# Patient Record
Sex: FEMALE | Race: WHITE
[De-identification: names, ages, dates, MRNs, and addresses within clinical notes are randomized per-mention and may not be internally consistent; named-entity substitution may affect disease eponyms.]

---

## 2019-01-08 ENCOUNTER — HOSPITAL ENCOUNTER (INPATIENT)
Dept: HOSPITAL 26 - MED | Age: 44
LOS: 4 days | Discharge: HOME | DRG: 720 | End: 2019-01-12
Attending: GENERAL PRACTICE | Admitting: GENERAL PRACTICE
Payer: MEDICAID

## 2019-01-08 VITALS — BODY MASS INDEX: 28.27 KG/M2 | HEIGHT: 60 IN | WEIGHT: 144 LBS

## 2019-01-08 VITALS — SYSTOLIC BLOOD PRESSURE: 143 MMHG | DIASTOLIC BLOOD PRESSURE: 82 MMHG

## 2019-01-08 VITALS — SYSTOLIC BLOOD PRESSURE: 126 MMHG | DIASTOLIC BLOOD PRESSURE: 76 MMHG

## 2019-01-08 VITALS — SYSTOLIC BLOOD PRESSURE: 107 MMHG | DIASTOLIC BLOOD PRESSURE: 55 MMHG

## 2019-01-08 DIAGNOSIS — N92.0: ICD-10-CM

## 2019-01-08 DIAGNOSIS — E87.6: ICD-10-CM

## 2019-01-08 DIAGNOSIS — Z23: ICD-10-CM

## 2019-01-08 DIAGNOSIS — F17.210: ICD-10-CM

## 2019-01-08 DIAGNOSIS — Z79.4: ICD-10-CM

## 2019-01-08 DIAGNOSIS — E87.5: ICD-10-CM

## 2019-01-08 DIAGNOSIS — E83.42: ICD-10-CM

## 2019-01-08 DIAGNOSIS — L03.115: ICD-10-CM

## 2019-01-08 DIAGNOSIS — K59.00: ICD-10-CM

## 2019-01-08 DIAGNOSIS — Z79.899: ICD-10-CM

## 2019-01-08 DIAGNOSIS — E87.1: ICD-10-CM

## 2019-01-08 DIAGNOSIS — D50.9: ICD-10-CM

## 2019-01-08 DIAGNOSIS — F15.10: ICD-10-CM

## 2019-01-08 DIAGNOSIS — E83.41: ICD-10-CM

## 2019-01-08 DIAGNOSIS — E78.5: ICD-10-CM

## 2019-01-08 DIAGNOSIS — E11.65: ICD-10-CM

## 2019-01-08 DIAGNOSIS — Z91.19: ICD-10-CM

## 2019-01-08 DIAGNOSIS — E66.3: ICD-10-CM

## 2019-01-08 DIAGNOSIS — D47.3: ICD-10-CM

## 2019-01-08 DIAGNOSIS — A41.9: Primary | ICD-10-CM

## 2019-01-08 DIAGNOSIS — Z79.84: ICD-10-CM

## 2019-01-08 DIAGNOSIS — E87.8: ICD-10-CM

## 2019-01-08 DIAGNOSIS — E78.00: ICD-10-CM

## 2019-01-08 DIAGNOSIS — E43: ICD-10-CM

## 2019-01-08 LAB
ALBUMIN FLD-MCNC: 1.9 G/DL (ref 3.4–5)
AMYLASE SERPL-CCNC: 24 U/L (ref 25–115)
ANION GAP SERPL CALCULATED.3IONS-SCNC: 12 MMOL/L (ref 8–16)
APAP SERPL-MCNC: < 0.5 UG/ML (ref 10–30)
AST SERPL-CCNC: 30 U/L (ref 15–37)
BASOPHILS # BLD AUTO: 0 K/UL (ref 0–0.22)
BASOPHILS NFR BLD AUTO: 0.4 % (ref 0–2)
BILIRUB SERPL-MCNC: 0.2 MG/DL (ref 0–1)
BUN SERPL-MCNC: 6 MG/DL (ref 7–18)
CHLORIDE SERPL-SCNC: 96 MMOL/L (ref 98–107)
CO2 SERPL-SCNC: 30.2 MMOL/L (ref 21–32)
CREAT SERPL-MCNC: 0.6 MG/DL (ref 0.6–1.3)
EOSINOPHIL # BLD AUTO: 0.1 K/UL (ref 0–0.4)
EOSINOPHIL NFR BLD AUTO: 0.6 % (ref 0–4)
ERYTHROCYTE [DISTWIDTH] IN BLOOD BY AUTOMATED COUNT: 18.8 % (ref 11.6–13.7)
GFR SERPL CREATININE-BSD FRML MDRD: 140 ML/MIN (ref 90–?)
GLUCOSE SERPL-MCNC: 116 MG/DL (ref 74–106)
HCT VFR BLD AUTO: 24.1 % (ref 36–48)
HGB BLD-MCNC: 7.1 G/DL (ref 12–16)
IRON SERPL-MCNC: 7 UG/DL (ref 35–150)
LIPASE SERPL-CCNC: 66 U/L (ref 73–393)
LYMPHOCYTES # BLD AUTO: 1.2 K/UL (ref 2.5–16.5)
LYMPHOCYTES NFR BLD AUTO: 9 % (ref 20.5–51.1)
MAGNESIUM SERPL-MCNC: 1.5 MG/DL (ref 1.8–2.4)
MCH RBC QN AUTO: 19 PG (ref 27–31)
MCHC RBC AUTO-ENTMCNC: 29 G/DL (ref 33–37)
MCV RBC AUTO: 65 FL (ref 80–94)
MONOCYTES # BLD AUTO: 1.3 K/UL (ref 0.8–1)
MONOCYTES NFR BLD AUTO: 10.2 % (ref 1.7–9.3)
NEUTROPHILS # BLD AUTO: 10.2 K/UL (ref 1.8–7.7)
NEUTROPHILS NFR BLD AUTO: 79.8 % (ref 42.2–75.2)
PHOSPHATE SERPL-MCNC: 2.6 MG/DL (ref 2.5–4.9)
PLATELET # BLD AUTO: 499 K/UL (ref 140–450)
POTASSIUM SERPL-SCNC: 3.2 MMOL/L (ref 3.5–5.1)
PROTHROMBIN TIME: 10.7 SECS (ref 10.8–13.4)
RBC # BLD AUTO: 3.71 MIL/UL (ref 4.2–5.4)
SALICYLATES SERPL-MCNC: < 2.8 MG/DL (ref 2.8–20)
SODIUM SERPL-SCNC: 135 MMOL/L (ref 136–145)
T4 FREE SERPL-MCNC: 0.93 NG/DL (ref 0.76–1.46)
TIBC SERPL-MCNC: 187 UG/DL (ref 250–450)
TSH SERPL DL<=0.05 MIU/L-ACNC: 3.07 UIU/ML (ref 0.34–3.74)
WBC # BLD AUTO: 12.8 K/UL (ref 4.8–10.8)

## 2019-01-08 PROCEDURE — G0480 DRUG TEST DEF 1-7 CLASSES: HCPCS

## 2019-01-08 PROCEDURE — P9016 RBC LEUKOCYTES REDUCED: HCPCS

## 2019-01-08 PROCEDURE — G0482 DRUG TEST DEF 15-21 CLASSES: HCPCS

## 2019-01-08 RX ADMIN — Medication SCH DEV: at 07:30

## 2019-01-08 RX ADMIN — OXYCODONE HYDROCHLORIDE AND ACETAMINOPHEN SCH MG: 500 TABLET ORAL at 10:48

## 2019-01-08 RX ADMIN — Medication SCH EACH: at 10:48

## 2019-01-08 RX ADMIN — Medication SCH DEV: at 21:08

## 2019-01-08 RX ADMIN — DOCUSATE SODIUM SCH MG: 100 CAPSULE, LIQUID FILLED ORAL at 20:45

## 2019-01-08 RX ADMIN — INSULIN LISPRO PRN UNITS: 100 INJECTION, SOLUTION INTRAVENOUS; SUBCUTANEOUS at 14:15

## 2019-01-08 RX ADMIN — VANCOMYCIN HYDROCHLORIDE SCH MLS/HR: 1 INJECTION, SOLUTION INTRAVENOUS at 19:12

## 2019-01-08 RX ADMIN — MAGNESIUM SULFATE IN WATER SCH MLS/HR: 40 INJECTION, SOLUTION INTRAVENOUS at 17:00

## 2019-01-08 RX ADMIN — Medication SCH DEV: at 11:30

## 2019-01-08 RX ADMIN — FERROUS SULFATE TAB 325 MG (65 MG ELEMENTAL FE) SCH MG: 325 (65 FE) TAB at 18:33

## 2019-01-08 RX ADMIN — SODIUM CHLORIDE SCH MLS/HR: 9 INJECTION, SOLUTION INTRAVENOUS at 06:35

## 2019-01-08 RX ADMIN — HYDROCODONE BITARTRATE AND ACETAMINOPHEN PRN TAB: 7.5; 325 TABLET ORAL at 20:02

## 2019-01-08 RX ADMIN — INSULIN LISPRO PRN UNITS: 100 INJECTION, SOLUTION INTRAVENOUS; SUBCUTANEOUS at 21:15

## 2019-01-08 RX ADMIN — DOCUSATE SODIUM SCH MG: 100 CAPSULE, LIQUID FILLED ORAL at 10:48

## 2019-01-08 RX ADMIN — OXYCODONE HYDROCHLORIDE AND ACETAMINOPHEN SCH MG: 500 TABLET ORAL at 20:45

## 2019-01-08 RX ADMIN — SODIUM FERRIC GLUCONATE COMPLEX SCH MLS/HR: 12.5 INJECTION INTRAVENOUS at 13:33

## 2019-01-08 RX ADMIN — HYDROCODONE BITARTRATE AND ACETAMINOPHEN PRN TAB: 7.5; 325 TABLET ORAL at 16:06

## 2019-01-08 RX ADMIN — SODIUM CHLORIDE SCH MLS/HR: 9 INJECTION, SOLUTION INTRAVENOUS at 16:35

## 2019-01-08 RX ADMIN — INSULIN LISPRO PRN UNITS: 100 INJECTION, SOLUTION INTRAVENOUS; SUBCUTANEOUS at 18:34

## 2019-01-08 RX ADMIN — KETOROLAC TROMETHAMINE PRN MG: 30 INJECTION, SOLUTION INTRAMUSCULAR; INTRAVENOUS at 20:43

## 2019-01-08 RX ADMIN — POTASSIUM CHLORIDE SCH MEQ: 750 TABLET, FILM COATED, EXTENDED RELEASE ORAL at 10:49

## 2019-01-08 RX ADMIN — Medication SCH DEV: at 16:30

## 2019-01-08 RX ADMIN — MAGNESIUM SULFATE IN WATER SCH MLS/HR: 40 INJECTION, SOLUTION INTRAVENOUS at 10:50

## 2019-01-08 NOTE — NUR
ASSUMED CARE OF PT AT THIS TIME. C/O RIGHT LOWER EXTREMITY REDNESS/SWELLING AND 
3+ PITTING X 3 WEEKS. PT SEEN HERE/ADMITTED 1 WEEK FOR SAME COMPLAINT. PT IS 
AAOX4; PATIENT STATES PAIN OF 10/10 AT THIS TIME; VSS; PATIENT POSITIONED FOR 
COMFORT; HOB ELEVATED; BEDRAILS UP X2; BED DOWN. ER MD MADE AWARE OF PT STATUS. 
WILL CONTINUE TO MONITOR.

## 2019-01-08 NOTE — NUR
Patient will be admitted to care of [facility]. Admitted to MED-SURG.  Will go to 
room 122A. Belongings list completed.  Report to VIOLA JONES.

## 2019-01-08 NOTE — NUR
PATIENT HAS BEEN SCREENED AND CATEGORIZED AS HIGH NUTRITION RISK. PATIENT WILL BE SEEN 
WITHIN 1-2 DAYS OF ADMISSION.



01/08/19-01/09/19



ALEN BONE RD

## 2019-01-08 NOTE — NUR
RECHECKED PT'S TEMP, IT  (ORAL) AT THIS TIME. MD IS AWARE. COOLING MEASURES IN PLACE. 
WILL CONTINUE TO MONITOR PT.

## 2019-01-08 NOTE — NUR
RECEIVED PT REPORT FROM THE ED NURSE. PT IS AWAKE AND ALERT, Wallisian SPEAKING ONLY, BUT 
UNDERSTANDS A LITTLE BIT OF ENGLISH. PT IS ON ROOM AIR, SKIN IS INTACT. CELLULITIS REDNESS 
NOTED  ON THE PT'S R CALF, WHICH THE PT REPORTS AS PAINFUL. IV SITE NOTED ON THE L AC, 20 
GAUGE. INFUSING VANCO AT THIS TIME. PT STATES THAT SHE IS NOT ABLE TO WALK A LONG DISTANCE 
AT THIS TIME BECAUSE HER LEG HURTS DUE TO CELLULITIS. SHE WOULD LIKE TO HAVE A BEDSIDE 
COMMODE. CALL LIGHT PROVIDED WITHIN REACH. VS STABLE, NARES SWABBED FOR MRSA. WILL CONTINUE 
TO MONITOR PT. 

-------------------------------------------------------------------------------

Addendum: 01/08/19 at 0844 by Patricia Prasad RN

-------------------------------------------------------------------------------

VITAL SIGNS ON ADMISSION: /55, HR 98, O2 99 ON RA, TEMP 98.8, RESP 16.

## 2019-01-08 NOTE — NUR
RECEIVED PT REPORT FROM THE AM SHIFT NURSE. PT IS AWAKE AND ALERT, Slovak SPEAKING NO 
RESPIRATORY DISTRESS NOTED. PT IS ON ROOM AIR, SKIN IS INTACT. CELLULITIS REDNESS NOTED  ON 
THE PT'S R CALF, WHICH THE PT REPORTS AS PAINFUL. PREVIOUS SITE L AC, 20 GAUGE INFILTRATED 
WHILE VANCO INFUSING, REDNESS SWELLING NOTED.  ADMINISTERED PAIN MEDS. REINSERTED IV ON LEFT 
HAND G 22, INFUSING VANCO CONTINUED FROM LAST SHIFT. CALL LIGHT PROVIDED WITHIN REACH.  WILL 
CONTINUE TO MONITOR PT.

## 2019-01-08 NOTE — NUR
PT IS AWAKE AND INSULIN 4 UNITS WAS GIVEN ON THE RT UA, BLOOD GLUCOSE WAS CHECKED WITH VIOLA JONES AND . PT TOLERATED THE MEDICATION. WILL INFORM AM RN ON DUTY.

## 2019-01-08 NOTE — NUR
PT'S TEMP AT THIS TIME .9. MD IS AWARE. PT'S RLE APPEARS MORE SWOLLEN THAN AT 
ADMISSION. NORCO WAS GIVEN FOR 7/10 PAIN IN THE RLE. COOLING MEASURES IN PLACE. WILL 
CONTINUE TO MONITOR.

## 2019-01-09 VITALS — SYSTOLIC BLOOD PRESSURE: 118 MMHG | DIASTOLIC BLOOD PRESSURE: 64 MMHG

## 2019-01-09 VITALS — SYSTOLIC BLOOD PRESSURE: 116 MMHG | DIASTOLIC BLOOD PRESSURE: 67 MMHG

## 2019-01-09 VITALS — SYSTOLIC BLOOD PRESSURE: 122 MMHG | DIASTOLIC BLOOD PRESSURE: 68 MMHG

## 2019-01-09 LAB
AMORPH SED URNS QL MICRO: (no result) /HPF
ANION GAP SERPL CALCULATED.3IONS-SCNC: 18.6 MMOL/L (ref 8–16)
APPEARANCE UR: (no result)
BARBITURATES UR QL SCN: NEGATIVE NG/ML
BENZODIAZ UR QL SCN: NEGATIVE NG/ML
BILIRUB UR QL STRIP: (no result)
BUN SERPL-MCNC: 12 MG/DL (ref 7–18)
BZE UR QL SCN: NEGATIVE NG/ML
CANNABINOIDS UR QL SCN: NEGATIVE NG/ML
CHLORIDE SERPL-SCNC: 96 MMOL/L (ref 98–107)
CHOLEST/HDLC SERPL: 3 {RATIO} (ref 1–4.5)
CO2 SERPL-SCNC: 24.6 MMOL/L (ref 21–32)
COARSE GRAN CASTS URNS QL MICRO: (no result) /LPF
COLOR UR: (no result)
CREAT SERPL-MCNC: 0.8 MG/DL (ref 0.6–1.3)
EOSINOPHIL NFR BLD MANUAL: 1 % (ref 0–4)
ERYTHROCYTE [DISTWIDTH] IN BLOOD BY AUTOMATED COUNT: 18.9 % (ref 11.6–13.7)
FERRITIN SERPL-MCNC: 98 NG/ML (ref 15–150)
FOLATE SERPL-MCNC: 13.1 NG/ML (ref 3–?)
GFR SERPL CREATININE-BSD FRML MDRD: 101 ML/MIN (ref 90–?)
GLUCOSE SERPL-MCNC: 132 MG/DL (ref 74–106)
GLUCOSE UR STRIP-MCNC: NEGATIVE MG/DL
HCT VFR BLD AUTO: 23.3 % (ref 36–48)
HDLC SERPL-MCNC: 32 MG/DL (ref 40–60)
HGB BLD-MCNC: 6.9 G/DL (ref 12–16)
HGB UR QL STRIP: (no result)
LDLC SERPL CALC-MCNC: 46 MG/DL (ref 60–100)
LEUKOCYTE ESTERASE UR QL STRIP: NEGATIVE
LYMPHOCYTES NFR BLD MANUAL: 12 % (ref 20–46)
MAGNESIUM SERPL-MCNC: 2.6 MG/DL (ref 1.8–2.4)
MCH RBC QN AUTO: 19 PG (ref 27–31)
MCHC RBC AUTO-ENTMCNC: 30 G/DL (ref 33–37)
MCV RBC AUTO: 65.8 FL (ref 80–94)
MONOCYTES NFR BLD MANUAL: 9 % (ref 5–12)
NITRITE UR QL STRIP: NEGATIVE
NRBC BLD MANUAL-RTO: 1 /100WBC (ref 0–0)
OPIATES UR QL SCN: POSITIVE NG/ML
PCP UR QL SCN: NEGATIVE NG/ML
PH UR STRIP: 6 [PH] (ref 5–9)
PHOSPHATE SERPL-MCNC: 3.1 MG/DL (ref 2.5–4.9)
PLATELET # BLD AUTO: 527 K/UL (ref 140–450)
POTASSIUM SERPL-SCNC: 4.2 MMOL/L (ref 3.5–5.1)
RBC # BLD AUTO: 3.53 MIL/UL (ref 4.2–5.4)
RBC #/AREA URNS HPF: (no result) /HPF (ref 0–5)
SODIUM SERPL-SCNC: 135 MMOL/L (ref 136–145)
TRANSFERRIN SERPL-MCNC: 159 MG/DL (ref 200–370)
TRIGL SERPL-MCNC: 95 MG/DL (ref 30–150)
VIT B12 SERPL-MCNC: 1003 PG/ML (ref 232–1245)
WBC # BLD AUTO: 15.9 K/UL (ref 4.8–10.8)
WBC,URINE: (no result) /HPF (ref 0–5)

## 2019-01-09 RX ADMIN — Medication SCH DEV: at 20:58

## 2019-01-09 RX ADMIN — SODIUM CHLORIDE SCH MLS/HR: 9 INJECTION, SOLUTION INTRAVENOUS at 22:52

## 2019-01-09 RX ADMIN — DOCUSATE SODIUM SCH MG: 100 CAPSULE, LIQUID FILLED ORAL at 09:29

## 2019-01-09 RX ADMIN — SODIUM FERRIC GLUCONATE COMPLEX SCH MLS/HR: 12.5 INJECTION INTRAVENOUS at 11:02

## 2019-01-09 RX ADMIN — INSULIN LISPRO PRN UNITS: 100 INJECTION, SOLUTION INTRAVENOUS; SUBCUTANEOUS at 06:51

## 2019-01-09 RX ADMIN — HYDROCODONE BITARTRATE AND ACETAMINOPHEN PRN TAB: 7.5; 325 TABLET ORAL at 11:01

## 2019-01-09 RX ADMIN — VANCOMYCIN HYDROCHLORIDE SCH MLS/HR: 1 INJECTION, SOLUTION INTRAVENOUS at 05:48

## 2019-01-09 RX ADMIN — LISINOPRIL SCH MG: 5 TABLET ORAL at 09:29

## 2019-01-09 RX ADMIN — Medication SCH DEV: at 17:15

## 2019-01-09 RX ADMIN — OXYCODONE HYDROCHLORIDE AND ACETAMINOPHEN SCH MG: 500 TABLET ORAL at 20:47

## 2019-01-09 RX ADMIN — POTASSIUM CHLORIDE SCH MEQ: 750 TABLET, FILM COATED, EXTENDED RELEASE ORAL at 06:22

## 2019-01-09 RX ADMIN — SODIUM CHLORIDE SCH MLS/HR: 9 INJECTION, SOLUTION INTRAVENOUS at 02:35

## 2019-01-09 RX ADMIN — Medication SCH EACH: at 09:28

## 2019-01-09 RX ADMIN — FERROUS SULFATE TAB 325 MG (65 MG ELEMENTAL FE) SCH MG: 325 (65 FE) TAB at 09:29

## 2019-01-09 RX ADMIN — SODIUM CHLORIDE SCH MLS/HR: 9 INJECTION, SOLUTION INTRAVENOUS at 12:42

## 2019-01-09 RX ADMIN — OXYCODONE HYDROCHLORIDE AND ACETAMINOPHEN SCH MG: 500 TABLET ORAL at 09:28

## 2019-01-09 RX ADMIN — Medication SCH DEV: at 06:21

## 2019-01-09 RX ADMIN — HYDROCODONE BITARTRATE AND ACETAMINOPHEN PRN TAB: 7.5; 325 TABLET ORAL at 06:22

## 2019-01-09 RX ADMIN — DOCUSATE SODIUM SCH MG: 100 CAPSULE, LIQUID FILLED ORAL at 20:47

## 2019-01-09 RX ADMIN — FERROUS SULFATE TAB 325 MG (65 MG ELEMENTAL FE) SCH MG: 325 (65 FE) TAB at 17:13

## 2019-01-09 RX ADMIN — INSULIN LISPRO PRN UNITS: 100 INJECTION, SOLUTION INTRAVENOUS; SUBCUTANEOUS at 20:52

## 2019-01-09 RX ADMIN — VANCOMYCIN HYDROCHLORIDE SCH MLS/HR: 1 INJECTION, SOLUTION INTRAVENOUS at 12:42

## 2019-01-09 RX ADMIN — Medication SCH DEV: at 11:30

## 2019-01-09 RX ADMIN — HYDROCODONE BITARTRATE AND ACETAMINOPHEN PRN TAB: 7.5; 325 TABLET ORAL at 20:48

## 2019-01-09 NOTE — NUR
PT IS AWAKE AND SEATED ON THE BED, EATING HER LUNCH, IV MEDICATION WAS GIVEN VIA PIGGYBACK, 
PT TOLERATED IT AND WILL MONITOR PT.

## 2019-01-09 NOTE — NUR
WAS INFORMED BY LAB THAT PT HAS LOW HEMOGLOBIN. A.M RESIDENT PHYSICIAN AWARE. ENDORSED TO 
NEXT SHIFT.

## 2019-01-09 NOTE — NUR
DR. OROURKE CAME TO PT ROOM AND ASSESSED PT WITH DR. MENDOZA AND DR. LAWRENCE ON THE 
BEDSIDE. BLOOD GLUCOSE CHEK DONE AND RESULT  AND NO INSULIN COVERAGE NEEDED. WILL 
CONTINUE TO MONITOR PT.

## 2019-01-09 NOTE — NUR
ENDORSED TO AM SHIFT WUTH STABLE VITAL SIGNS, AFEBRILE 99.2 SINCE LAST EPISODE OF FEVER. PT 
WAS GIVEN NORCO AT 0622 DUE TO PAIN 8/10 ON R LE CELLULITIS

## 2019-01-09 NOTE — NUR
PT IS AWAKE AND BLOOD GLUCOSE CHECK DONE AND RESULT  AND NO INSULIN COVERAGE NEEDED, 
VITAL SIGN TAKEN AND IS WITHIN NORMAL LIMIT. ORAL MEDICATIONS GIVEN AND PT TOLERATED IT. NO 
SIGN OF DISTRESS NOTED. WILL MONITOR PT.

## 2019-01-09 NOTE — NUR
RECEIVED REPORT FROM DAYSHIFT NURSE AT BEDSIDE FOR CONTINUITY OF CARE. NO SOB NO S/S OF 
DISTRESS ON RA. IV NOTED L WRIST 22 G NS 100ML/HR. BED LOWERED CALL LIGHT WITHIN REACH WILL 
CONTINUE TO MONITOR.

## 2019-01-09 NOTE — NUR
1/9/19 RD INITIAL ASSESSMENT COMPLETED



PLEASE REFER TO NUTRITION ASSESSMENT UNDER CARE ACTIVITY FOR ESTIMATED NUTRITIONAL NEEDS. 



1. CONTINUE CCHO 60 GM DIET AS TOLERATED 

2. PROVIDED NUTRITION EDUCATION ON ANEMIA. PT ACCEPTED

3. RD TO FOLLOW UP WITH ADDITIONAL DIABETES NUTRITION EDUCATION

4. RD TO FOLLOW-UP 3-5 DAYS, MODERATE RISK 



ALEN BONE RD

## 2019-01-09 NOTE — NUR
RECEIVED PT FROM NIGHT SHIFT NURSEKRYSTIN, PT IS AWAKE AND LYING ON THE BED WITH SIDE 
RAILS UP AND CALL LIGHT WITHIN REACH, PT HAS AN IV LINE ON THE RT WRIST G.22 WITH NS AT 
100ML/HR INFUSING. NO SOB AND PT VERBALIZED A PAIN RATE OF 7/10, NO OTHER UNTOWARD SIGN 
NOTED. WILL CONTINUE TO MONITOR PT.

## 2019-01-10 VITALS — SYSTOLIC BLOOD PRESSURE: 106 MMHG | DIASTOLIC BLOOD PRESSURE: 59 MMHG

## 2019-01-10 VITALS — DIASTOLIC BLOOD PRESSURE: 54 MMHG | SYSTOLIC BLOOD PRESSURE: 107 MMHG

## 2019-01-10 VITALS — SYSTOLIC BLOOD PRESSURE: 120 MMHG | DIASTOLIC BLOOD PRESSURE: 72 MMHG

## 2019-01-10 VITALS — SYSTOLIC BLOOD PRESSURE: 127 MMHG | DIASTOLIC BLOOD PRESSURE: 64 MMHG

## 2019-01-10 VITALS — DIASTOLIC BLOOD PRESSURE: 68 MMHG | SYSTOLIC BLOOD PRESSURE: 117 MMHG

## 2019-01-10 LAB
ANION GAP SERPL CALCULATED.3IONS-SCNC: 13.3 MMOL/L (ref 8–16)
APPEARANCE UR: (no result)
BASOPHILS # BLD AUTO: 0 K/UL (ref 0–0.22)
BASOPHILS NFR BLD AUTO: 0.2 % (ref 0–2)
BILIRUB UR QL STRIP: NEGATIVE
BUN SERPL-MCNC: 10 MG/DL (ref 7–18)
CHLORIDE SERPL-SCNC: 99 MMOL/L (ref 98–107)
CO2 SERPL-SCNC: 24.2 MMOL/L (ref 21–32)
COLOR UR: YELLOW
CREAT SERPL-MCNC: 0.8 MG/DL (ref 0.6–1.3)
EOSINOPHIL # BLD AUTO: 0 K/UL (ref 0–0.4)
EOSINOPHIL NFR BLD AUTO: 0.2 % (ref 0–4)
ERYTHROCYTE [DISTWIDTH] IN BLOOD BY AUTOMATED COUNT: 18.7 % (ref 11.6–13.7)
ERYTHROCYTE [DISTWIDTH] IN BLOOD BY AUTOMATED COUNT: 20.3 % (ref 11.6–13.7)
GFR SERPL CREATININE-BSD FRML MDRD: 101 ML/MIN (ref 90–?)
GLUCOSE SERPL-MCNC: 161 MG/DL (ref 74–106)
GLUCOSE UR STRIP-MCNC: NEGATIVE MG/DL
HCT VFR BLD AUTO: 21 % (ref 36–48)
HCT VFR BLD AUTO: 22.8 % (ref 36–48)
HGB BLD-MCNC: 6 G/DL (ref 12–16)
HGB BLD-MCNC: 6.7 G/DL (ref 12–16)
HGB UR QL STRIP: NEGATIVE
LEUKOCYTE ESTERASE UR QL STRIP: NEGATIVE
LYMPHOCYTES # BLD AUTO: 0.6 K/UL (ref 2.5–16.5)
LYMPHOCYTES NFR BLD AUTO: 3.7 % (ref 20.5–51.1)
LYMPHOCYTES NFR BLD MANUAL: 17 % (ref 20–46)
MAGNESIUM SERPL-MCNC: 2 MG/DL (ref 1.8–2.4)
MCH RBC QN AUTO: 19 PG (ref 27–31)
MCH RBC QN AUTO: 20 PG (ref 27–31)
MCHC RBC AUTO-ENTMCNC: 29 G/DL (ref 33–37)
MCHC RBC AUTO-ENTMCNC: 30 G/DL (ref 33–37)
MCV RBC AUTO: 65.7 FL (ref 80–94)
MCV RBC AUTO: 67.4 FL (ref 80–94)
MONOCYTES # BLD AUTO: 1.5 K/UL (ref 0.8–1)
MONOCYTES NFR BLD AUTO: 9.1 % (ref 1.7–9.3)
MONOCYTES NFR BLD MANUAL: 6 % (ref 5–12)
NEUTROPHILS # BLD AUTO: 14.3 K/UL (ref 1.8–7.7)
NEUTROPHILS NFR BLD AUTO: 86.8 % (ref 42.2–75.2)
NITRITE UR QL STRIP: NEGATIVE
PH UR STRIP: 6 [PH] (ref 5–9)
PHOSPHATE SERPL-MCNC: 3.6 MG/DL (ref 2.5–4.9)
PLATELET # BLD AUTO: 525 K/UL (ref 140–450)
PLATELET # BLD AUTO: 567 K/UL (ref 140–450)
POTASSIUM SERPL-SCNC: 4.5 MMOL/L (ref 3.5–5.1)
RBC # BLD AUTO: 3.19 MIL/UL (ref 4.2–5.4)
RBC # BLD AUTO: 3.38 MIL/UL (ref 4.2–5.4)
SODIUM SERPL-SCNC: 132 MMOL/L (ref 136–145)
WBC # BLD AUTO: 14.1 K/UL (ref 4.8–10.8)
WBC # BLD AUTO: 16.5 K/UL (ref 4.8–10.8)

## 2019-01-10 PROCEDURE — 30233N1 TRANSFUSION OF NONAUTOLOGOUS RED BLOOD CELLS INTO PERIPHERAL VEIN, PERCUTANEOUS APPROACH: ICD-10-PCS | Performed by: GENERAL PRACTICE

## 2019-01-10 RX ADMIN — Medication SCH EACH: at 08:57

## 2019-01-10 RX ADMIN — Medication SCH DEV: at 20:30

## 2019-01-10 RX ADMIN — SODIUM CHLORIDE SCH MLS/HR: 9 INJECTION, SOLUTION INTRAVENOUS at 18:35

## 2019-01-10 RX ADMIN — DOCUSATE SODIUM SCH MG: 100 CAPSULE, LIQUID FILLED ORAL at 20:29

## 2019-01-10 RX ADMIN — Medication SCH DEV: at 11:34

## 2019-01-10 RX ADMIN — Medication SCH DEV: at 06:44

## 2019-01-10 RX ADMIN — SODIUM FERRIC GLUCONATE COMPLEX SCH MLS/HR: 12.5 INJECTION INTRAVENOUS at 10:42

## 2019-01-10 RX ADMIN — DOCUSATE SODIUM SCH MG: 100 CAPSULE, LIQUID FILLED ORAL at 08:56

## 2019-01-10 RX ADMIN — VANCOMYCIN HYDROCHLORIDE SCH MLS/HR: 1 INJECTION, SOLUTION INTRAVENOUS at 12:00

## 2019-01-10 RX ADMIN — TAZOBACTAM SODIUM AND PIPERACILLIN SODIUM SCH MLS/HR: 375; 3 INJECTION, SOLUTION INTRAVENOUS at 17:36

## 2019-01-10 RX ADMIN — INSULIN LISPRO PRN UNITS: 100 INJECTION, SOLUTION INTRAVENOUS; SUBCUTANEOUS at 20:34

## 2019-01-10 RX ADMIN — SODIUM CHLORIDE SCH MLS/HR: 9 INJECTION, SOLUTION INTRAVENOUS at 09:50

## 2019-01-10 RX ADMIN — TAZOBACTAM SODIUM AND PIPERACILLIN SODIUM SCH MLS/HR: 375; 3 INJECTION, SOLUTION INTRAVENOUS at 12:00

## 2019-01-10 RX ADMIN — INSULIN LISPRO PRN UNITS: 100 INJECTION, SOLUTION INTRAVENOUS; SUBCUTANEOUS at 11:34

## 2019-01-10 RX ADMIN — HYDROCODONE BITARTRATE AND ACETAMINOPHEN PRN TAB: 7.5; 325 TABLET ORAL at 20:29

## 2019-01-10 RX ADMIN — Medication SCH DEV: at 17:09

## 2019-01-10 RX ADMIN — VANCOMYCIN HYDROCHLORIDE SCH MLS/HR: 1 INJECTION, SOLUTION INTRAVENOUS at 01:29

## 2019-01-10 RX ADMIN — OXYCODONE HYDROCHLORIDE AND ACETAMINOPHEN SCH MG: 500 TABLET ORAL at 20:29

## 2019-01-10 RX ADMIN — PSYLLIUM HUSK SCH GM: 3.4 POWDER ORAL at 20:30

## 2019-01-10 RX ADMIN — FERROUS SULFATE TAB 325 MG (65 MG ELEMENTAL FE) SCH MG: 325 (65 FE) TAB at 17:36

## 2019-01-10 RX ADMIN — FERROUS SULFATE TAB 325 MG (65 MG ELEMENTAL FE) SCH MG: 325 (65 FE) TAB at 08:57

## 2019-01-10 RX ADMIN — LISINOPRIL SCH MG: 5 TABLET ORAL at 08:57

## 2019-01-10 RX ADMIN — OXYCODONE HYDROCHLORIDE AND ACETAMINOPHEN SCH MG: 500 TABLET ORAL at 08:57

## 2019-01-10 RX ADMIN — INSULIN GLARGINE SCH UNITS: 100 INJECTION, SOLUTION SUBCUTANEOUS at 20:33

## 2019-01-10 RX ADMIN — HYDROCODONE BITARTRATE AND ACETAMINOPHEN PRN TAB: 7.5; 325 TABLET ORAL at 05:08

## 2019-01-10 NOTE — NUR
PT RESTING IN BED. NO S/S OF RESPIRATORY DISTRESS OR DISCOMFORT NOTED AT THIS TIME. WILL 
CONTINUE TO MONITOR.

## 2019-01-10 NOTE — NUR
PATIENT AWAKE, ALERT. RESPIRATION EVEN, UNLABOR ON ROOM AIR. BLOOD IS TRANSFUSING WELL. NO 
SIGNS OF ALLERGIC REACTION SEEN. PATIENT IS STABLE AT THIS TIME. CALL LIGHT WITHIN REACH

## 2019-01-10 NOTE — NUR
IV ON AC WAS CLOGGED, IV WAS REMOVED, CATHETER INTACT, NO ACTIVE BLEEDING SEEN. NEW IV WAS 
STARTED ON RIGHT AC. PATIENT TOLERATED WELL

## 2019-01-10 NOTE — NUR
RECEIVED REPORT FROM DAY SHIFT NURSE JACQUELINE-RN AT BEDSIDE. PT AOX4- Somali SPEAKER, ON ROOM 
AIR WITH IV SITE ON RIGHT AC #22G RUNNING NS @ 100ML/HR. RIGHT LOWER EXTREMITY CELLULITIS. 
DISCUSSED PLAN OF CARE AND PT VERBALIZED UNDERSTANDING. NO S/S OF RESPIRATORY DISTRESS OR 
DISCOMFORT NOTED AT THIS TIME. BED IN LOWEST POSITION, BED BREAKS ON, AND BOTH SIDE RAILS 
UP. BEDSIDE TABLE AND CALL LIGHT ARE WITHIN REACH. WILL CONTINUE TO MONITOR.

## 2019-01-10 NOTE — NUR
PT STATED SHE FELT VERY DIZZY AND NAUSEA. SHE STATED SHE FELT AS IS SHE WAS GOING TO FALL 
GOING TO BEDSIDE COMMODE. SHE STATED HER BLOOD SUGAR FELT LOW. JAZLYN CHECKED HER BLOOD 
SUGAR AND HER BLOOD SUGAR WAS 54. PT HAS NO IV ACCESS AT THIS TIME. GIVING HER ORANGE JUICE 
WITH SUGAR. AND WILL START IV TO GIVE D50.

## 2019-01-10 NOTE — NUR
SCHEDULED MEDICATION GIVEN AND INSULIN COVERAGE GIVEN AND TOLERATED WELL. PT C/O PAIN 6/10- 
NORCO GIVEN FOR PAIN. PT TOLERATED WELL. NO S/S OF RESPIRATORY DISTRESS OR DISCOMFORT NOTED 
AT THIS TIME. WILL CONTINUE TO MONITOR.

## 2019-01-10 NOTE — NUR
PATIENT WAS AWAKE, ALERT, SITING ON THE CHAIR COMFORTABLY. RESPIRATION EVEN, UNLABOR ON ROOM 
AIR. NO DISTRESS NOTED AT THIS TIME

## 2019-01-10 NOTE — NUR
PATIENT COMPLAINED OF ITCHINESS WITH FACIAL SWOLLEN. BLOOD TRANSFUSION WAS STOPPED. DR. KYLE WAS MADE AWARE.

## 2019-01-10 NOTE — NUR
RE-CHECK BLOOD SUGAR 149. WILL CONTINUE TO MONITOR. PT FEELING BETTER AND EATING SANDWICH 
AND DIET COKE.

## 2019-01-10 NOTE — NUR
PATIENT WAS AWAKE, ALERT, EATING BREAKFAST COMFORTABLY. RESPIRATION EVEN, UNLABOR ON ROOM 
AIR. SKIN DRY AND WARM. IV PATENT AND INTACT. COMPLAINED OF LEG PAIN 7/10, WILL MEDICATE PER 
ORDER. PLAN OF CARE WAS DISCUSSED WITH PATIENT. BED AT LOW POSITION, SIDE RAILS UP. CALL 
LIGHT WITHIN REACH

## 2019-01-10 NOTE — NUR
PATIENT WAS AWAKE, ALERT. RESPIRATION EVEN, UNLABOR ON ROOM AIR. FACIAL SWOLLEN WAS 
IMPROVED. NO DISTRESS WAS NOTED. IV PATENT AND INTACT. CALL LIGHT WITHIN REACH

## 2019-01-10 NOTE — NUR
BLOOD TRANSFUSION WAS OBTAINED AT BEDSIDE, SIGNED BY PATIENT

-------------------------------------------------------------------------------

Addendum: 01/10/19 at 1848 by Taryn Tan RN

-------------------------------------------------------------------------------

BLOOD TRANSFUSION CONSENT WAS OBTAINED

## 2019-01-10 NOTE — NUR
VITAL SIGNS TAKEN AND TOLERATED WELL. BLOOD GLUCOSE 274- WILL ADMINISTER INSULIN COVERAGE. 
NO S/S OF RESPIRATORY DISTRESS OR DISCOMFORT NOTED AT THIS TIME. WILL CONTINUE TO MONITOR.

## 2019-01-10 NOTE — NUR
PATIENT DEVELOP ALLERGIC REACTION TO BLOOD TRANFUSION WITH FACIAL AND TOUNGUE SWOLLEN, AND 
GENERALIZED ITCHINESS. BLOOD TRANFUSION WAS STOPPED, IV WAS FLUSHED WITH 10 CC NS. IV WAS 
RECONNECTED TO IVF NS  CC/HR. DR. TEJADA WAS MADE AWARE. BLOOD BAG AND LINE WAS 
RETURNED TO LAB.  PATIENT WAS PLACED ON CARDIAC MONITOR PER ORDER. MEDS WERE GIVEN

## 2019-01-11 VITALS — DIASTOLIC BLOOD PRESSURE: 65 MMHG | SYSTOLIC BLOOD PRESSURE: 113 MMHG

## 2019-01-11 VITALS — SYSTOLIC BLOOD PRESSURE: 106 MMHG | DIASTOLIC BLOOD PRESSURE: 66 MMHG

## 2019-01-11 VITALS — SYSTOLIC BLOOD PRESSURE: 94 MMHG | DIASTOLIC BLOOD PRESSURE: 56 MMHG

## 2019-01-11 VITALS — DIASTOLIC BLOOD PRESSURE: 77 MMHG | SYSTOLIC BLOOD PRESSURE: 124 MMHG

## 2019-01-11 LAB
ANION GAP SERPL CALCULATED.3IONS-SCNC: 11 MMOL/L (ref 8–16)
ANION GAP SERPL CALCULATED.3IONS-SCNC: 13.2 MMOL/L (ref 8–16)
BASOPHILS # BLD AUTO: 0 K/UL (ref 0–0.22)
BASOPHILS NFR BLD AUTO: 0.3 % (ref 0–2)
BUN SERPL-MCNC: 18 MG/DL (ref 7–18)
BUN SERPL-MCNC: 24 MG/DL (ref 7–18)
CHLORIDE SERPL-SCNC: 95 MMOL/L (ref 98–107)
CHLORIDE SERPL-SCNC: 98 MMOL/L (ref 98–107)
CO2 SERPL-SCNC: 24.4 MMOL/L (ref 21–32)
CO2 SERPL-SCNC: 25.6 MMOL/L (ref 21–32)
CREAT SERPL-MCNC: 0.9 MG/DL (ref 0.6–1.3)
CREAT SERPL-MCNC: 1.2 MG/DL (ref 0.6–1.3)
EOSINOPHIL # BLD AUTO: 0.1 K/UL (ref 0–0.4)
EOSINOPHIL NFR BLD AUTO: 0.7 % (ref 0–4)
ERYTHROCYTE [DISTWIDTH] IN BLOOD BY AUTOMATED COUNT: 20.7 % (ref 11.6–13.7)
GFR SERPL CREATININE-BSD FRML MDRD: 63 ML/MIN (ref 90–?)
GFR SERPL CREATININE-BSD FRML MDRD: 88 ML/MIN (ref 90–?)
GLUCOSE SERPL-MCNC: 348 MG/DL (ref 74–106)
GLUCOSE SERPL-MCNC: 392 MG/DL (ref 74–106)
HCT VFR BLD AUTO: 23.7 % (ref 36–48)
HGB BLD-MCNC: 7 G/DL (ref 12–16)
LYMPHOCYTES # BLD AUTO: 0.9 K/UL (ref 2.5–16.5)
LYMPHOCYTES NFR BLD AUTO: 5.2 % (ref 20.5–51.1)
MAGNESIUM SERPL-MCNC: 2 MG/DL (ref 1.8–2.4)
MCH RBC QN AUTO: 20 PG (ref 27–31)
MCHC RBC AUTO-ENTMCNC: 30 G/DL (ref 33–37)
MCV RBC AUTO: 67.6 FL (ref 80–94)
MONOCYTES # BLD AUTO: 0.2 K/UL (ref 0.8–1)
MONOCYTES NFR BLD AUTO: 1.2 % (ref 1.7–9.3)
NEUTROPHILS # BLD AUTO: 15.5 K/UL (ref 1.8–7.7)
NEUTROPHILS NFR BLD AUTO: 92.6 % (ref 42.2–75.2)
PHOSPHATE SERPL-MCNC: 4.2 MG/DL (ref 2.5–4.9)
PLATELET # BLD AUTO: 625 K/UL (ref 140–450)
POTASSIUM SERPL-SCNC: 4.6 MMOL/L (ref 3.5–5.1)
POTASSIUM SERPL-SCNC: 5.6 MMOL/L (ref 3.5–5.1)
RBC # BLD AUTO: 3.51 MIL/UL (ref 4.2–5.4)
SODIUM SERPL-SCNC: 128 MMOL/L (ref 136–145)
SODIUM SERPL-SCNC: 129 MMOL/L (ref 136–145)
WBC # BLD AUTO: 16.7 K/UL (ref 4.8–10.8)

## 2019-01-11 RX ADMIN — DOCUSATE SODIUM SCH MG: 100 CAPSULE, LIQUID FILLED ORAL at 10:02

## 2019-01-11 RX ADMIN — SODIUM CHLORIDE SCH MLS/HR: 9 INJECTION, SOLUTION INTRAVENOUS at 04:35

## 2019-01-11 RX ADMIN — IBUPROFEN SCH MG: 600 TABLET ORAL at 10:03

## 2019-01-11 RX ADMIN — VANCOMYCIN HYDROCHLORIDE SCH MLS/HR: 1 INJECTION, SOLUTION INTRAVENOUS at 00:35

## 2019-01-11 RX ADMIN — INSULIN LISPRO PRN UNITS: 100 INJECTION, SOLUTION INTRAVENOUS; SUBCUTANEOUS at 16:26

## 2019-01-11 RX ADMIN — Medication SCH DEV: at 21:25

## 2019-01-11 RX ADMIN — OXYCODONE HYDROCHLORIDE AND ACETAMINOPHEN SCH MG: 500 TABLET ORAL at 10:00

## 2019-01-11 RX ADMIN — TAZOBACTAM SODIUM AND PIPERACILLIN SODIUM SCH MLS/HR: 375; 3 INJECTION, SOLUTION INTRAVENOUS at 18:01

## 2019-01-11 RX ADMIN — TAZOBACTAM SODIUM AND PIPERACILLIN SODIUM SCH MLS/HR: 375; 3 INJECTION, SOLUTION INTRAVENOUS at 05:22

## 2019-01-11 RX ADMIN — SODIUM FERRIC GLUCONATE COMPLEX SCH MLS/HR: 12.5 INJECTION INTRAVENOUS at 12:11

## 2019-01-11 RX ADMIN — OXYCODONE HYDROCHLORIDE AND ACETAMINOPHEN SCH MG: 500 TABLET ORAL at 21:26

## 2019-01-11 RX ADMIN — INSULIN GLARGINE SCH UNITS: 100 INJECTION, SOLUTION SUBCUTANEOUS at 10:21

## 2019-01-11 RX ADMIN — Medication SCH DEV: at 05:39

## 2019-01-11 RX ADMIN — Medication SCH DEV: at 11:35

## 2019-01-11 RX ADMIN — TAZOBACTAM SODIUM AND PIPERACILLIN SODIUM SCH MLS/HR: 375; 3 INJECTION, SOLUTION INTRAVENOUS at 11:32

## 2019-01-11 RX ADMIN — VANCOMYCIN HYDROCHLORIDE SCH MLS/HR: 1 INJECTION, SOLUTION INTRAVENOUS at 14:08

## 2019-01-11 RX ADMIN — DOCUSATE SODIUM SCH MG: 100 CAPSULE, LIQUID FILLED ORAL at 21:26

## 2019-01-11 RX ADMIN — PSYLLIUM HUSK SCH GM: 3.4 POWDER ORAL at 10:00

## 2019-01-11 RX ADMIN — PSYLLIUM HUSK SCH GM: 3.4 POWDER ORAL at 21:26

## 2019-01-11 RX ADMIN — FERROUS SULFATE TAB 325 MG (65 MG ELEMENTAL FE) SCH MG: 325 (65 FE) TAB at 10:01

## 2019-01-11 RX ADMIN — INSULIN LISPRO PRN UNITS: 100 INJECTION, SOLUTION INTRAVENOUS; SUBCUTANEOUS at 05:42

## 2019-01-11 RX ADMIN — LISINOPRIL SCH MG: 5 TABLET ORAL at 09:00

## 2019-01-11 RX ADMIN — Medication SCH EACH: at 10:00

## 2019-01-11 RX ADMIN — INSULIN LISPRO PRN UNITS: 100 INJECTION, SOLUTION INTRAVENOUS; SUBCUTANEOUS at 12:11

## 2019-01-11 RX ADMIN — INSULIN GLARGINE SCH UNITS: 100 INJECTION, SOLUTION SUBCUTANEOUS at 21:29

## 2019-01-11 RX ADMIN — Medication SCH DEV: at 16:24

## 2019-01-11 RX ADMIN — TAZOBACTAM SODIUM AND PIPERACILLIN SODIUM SCH MLS/HR: 375; 3 INJECTION, SOLUTION INTRAVENOUS at 00:05

## 2019-01-11 RX ADMIN — INSULIN LISPRO PRN UNITS: 100 INJECTION, SOLUTION INTRAVENOUS; SUBCUTANEOUS at 21:30

## 2019-01-11 RX ADMIN — FERROUS SULFATE TAB 325 MG (65 MG ELEMENTAL FE) SCH MG: 325 (65 FE) TAB at 18:01

## 2019-01-11 NOTE — NUR
RECEIVED PT FROM NIGHT SHIFT NURSE, PT IS AWAKE AND LYING ON THE BED WITH SIDE RAILS UP AND 
CALL LIGHT WITHIN REACH, PT HAS AN IV LINE ON THE RT AC G. 20 WITH NS AT 100ML/HR INFUSING, 
INTACT, PT HAS A RT LEG CELLULITIS NOTED, SAFETY AND FALL PRECAUTION INITIATED, PT DENIES 
PAIN AND NO SOB NOTED. NO SIGN OF DISTRESS NOTED AND WILL CONTINUE TO MONITOR PT.

## 2019-01-11 NOTE — NUR
PT CONTINUES TO SLEEP IN BED. NO S/S OF RESPIRATORY DISTRESS OR DISCOMFORT NOTED AT THIS 
TIME. WILL CONTINUE TO MONITOR.

## 2019-01-11 NOTE — NUR
PT IS AWAKE AND EATING HER DINNER MEDICATIONS GIVEN VIA ORAL AND IVPB AND PT TOLERATED IT. 
NO SIGN OF DISTRESS NOTED AND WILL MONITOR PT.

## 2019-01-11 NOTE — NUR
RECEIVED BEDSIDE REPORT FROM DAY SHIFT NURSE TASH RN, PT STABLE, NO DISTRESS NOTED, IV TO 
RAC 22G PATENT, INTACT, INFUSING NS @ 10ML/HR, INFUSING WELL, PT ON ROOM AIR, NO SOB, NO C/O 
PAIN, INITIAL ASSESSMENT DONE, ALL SAFETY PRECAUTION DONE, CALL LIGHT WITHIN REACH, WILL 
CONTINUE TO MONITOR.

## 2019-01-11 NOTE — NUR
SPOKE WITH PHARMACIST FABIANA REGARDING NEXT SCHEDULED VANCO TROPH. SHE SAID THAT WOULD BE 
ORDERED BY IN HOUSE PHARMACISTS BEFORE NEXT SCHEDULED DOSE AND NOT BY HER. WILL ENDORSE TO 
DAY SHIFT NURSE.

## 2019-01-11 NOTE — NUR
SCHEDULED MEDICATION VANCOCIN GIVEN AND TOLERATED WELL. NO S/S OF RESPIRATORY DISTRESS OR 
DISCOMFORT NOTED AT THIS TIME. WILL CONTINUE TO MONITOR.

## 2019-01-11 NOTE — NUR
DUE MEDICATION ADMINISTERED, PT TOLERATED WELL, NO DISTRESS NOTED, CALL LIGHT WITHIN REACH, 
WILL CONTINUE TO MONITOR.

## 2019-01-11 NOTE — NUR
PT IS AWAKE AND EATING HER LUNCH, FERRLECIT WAS GIVEN VIA IVPB AND METFORMIN ORAL WAS GUIVEN 
ALSO AND PT TOLERATED IT. NO SIGN OF DISTRESS NOTED AND WILL CONTINUE TO MONITOR PT.

## 2019-01-11 NOTE — NUR
MED FROM LAB CALLED AND REPORTED THE PT'S HEMOGLOBIN LEVEL OF 7 AND HEMATOCRIT LEVEL OF 
23.7, ACKNOWLEDGED AND WILL INFORM MD.

## 2019-01-11 NOTE — NUR
DUE MEDICATION SODIUM TABLET NOT AVAILABLE AT UNIT, NOTIFIED DR. BARTHOLOMEW REGARDING 
UNAVAILABILITY,  STATED UNDERSTANDING AND STATED IT IS OK TO GIVE IN THE MORNING WHEN 
PHARMACY IS HERE.

## 2019-01-11 NOTE — NUR
BLOOD GLUCOSE 296- INSULIN COVERAGE GIVEN AND TOLERATED WELL. NO S/S OF RESPIRATORY DISTRESS 
OR DISCOMFORT NOTED AT THIS TIME. WILL CONTINUE TO MONITOR.

## 2019-01-11 NOTE — NUR
SCHEDULED MEDICATION ZOSYN GIVEN AND TOLERATED WELL. NO S/S OF RESPIRATORY DISTRESS OR 
DISCOMFORT NOTED AT THIS TIME. WILL CONTINUE TO MONITOR.

## 2019-01-12 VITALS — DIASTOLIC BLOOD PRESSURE: 66 MMHG | SYSTOLIC BLOOD PRESSURE: 118 MMHG

## 2019-01-12 VITALS — SYSTOLIC BLOOD PRESSURE: 110 MMHG | DIASTOLIC BLOOD PRESSURE: 67 MMHG

## 2019-01-12 LAB
ANION GAP SERPL CALCULATED.3IONS-SCNC: 11.7 MMOL/L (ref 8–16)
BUN SERPL-MCNC: 24 MG/DL (ref 7–18)
CHLORIDE SERPL-SCNC: 101 MMOL/L (ref 98–107)
CO2 SERPL-SCNC: 24.6 MMOL/L (ref 21–32)
CREAT SERPL-MCNC: 0.9 MG/DL (ref 0.6–1.3)
GFR SERPL CREATININE-BSD FRML MDRD: 88 ML/MIN (ref 90–?)
GLUCOSE SERPL-MCNC: 240 MG/DL (ref 74–106)
IRON SERPL-MCNC: 58 UG/DL (ref 35–150)
MAGNESIUM SERPL-MCNC: 2 MG/DL (ref 1.8–2.4)
PHOSPHATE SERPL-MCNC: 3.4 MG/DL (ref 2.5–4.9)
POTASSIUM SERPL-SCNC: 3.3 MMOL/L (ref 3.5–5.1)
SODIUM SERPL-SCNC: 134 MMOL/L (ref 136–145)

## 2019-01-12 PROCEDURE — 3E0234Z INTRODUCTION OF SERUM, TOXOID AND VACCINE INTO MUSCLE, PERCUTANEOUS APPROACH: ICD-10-PCS | Performed by: GENERAL PRACTICE

## 2019-01-12 PROCEDURE — 3E02340 INTRODUCTION OF INFLUENZA VACCINE INTO MUSCLE, PERCUTANEOUS APPROACH: ICD-10-PCS | Performed by: GENERAL PRACTICE

## 2019-01-12 RX ADMIN — PSYLLIUM HUSK SCH GM: 3.4 POWDER ORAL at 08:45

## 2019-01-12 RX ADMIN — TAZOBACTAM SODIUM AND PIPERACILLIN SODIUM SCH MLS/HR: 375; 3 INJECTION, SOLUTION INTRAVENOUS at 13:17

## 2019-01-12 RX ADMIN — VANCOMYCIN HYDROCHLORIDE SCH MLS/HR: 1 INJECTION, SOLUTION INTRAVENOUS at 14:16

## 2019-01-12 RX ADMIN — Medication SCH DEV: at 11:48

## 2019-01-12 RX ADMIN — DOCUSATE SODIUM SCH MG: 100 CAPSULE, LIQUID FILLED ORAL at 08:41

## 2019-01-12 RX ADMIN — Medication SCH EACH: at 08:42

## 2019-01-12 RX ADMIN — SODIUM FERRIC GLUCONATE COMPLEX SCH MLS/HR: 12.5 INJECTION INTRAVENOUS at 11:37

## 2019-01-12 RX ADMIN — KETOROLAC TROMETHAMINE PRN MG: 30 INJECTION, SOLUTION INTRAMUSCULAR; INTRAVENOUS at 04:27

## 2019-01-12 RX ADMIN — FERROUS SULFATE TAB 325 MG (65 MG ELEMENTAL FE) SCH MG: 325 (65 FE) TAB at 08:50

## 2019-01-12 RX ADMIN — INSULIN LISPRO PRN UNITS: 100 INJECTION, SOLUTION INTRAVENOUS; SUBCUTANEOUS at 11:46

## 2019-01-12 RX ADMIN — TAZOBACTAM SODIUM AND PIPERACILLIN SODIUM SCH MLS/HR: 375; 3 INJECTION, SOLUTION INTRAVENOUS at 05:37

## 2019-01-12 RX ADMIN — OXYCODONE HYDROCHLORIDE AND ACETAMINOPHEN SCH MG: 500 TABLET ORAL at 08:40

## 2019-01-12 RX ADMIN — TAZOBACTAM SODIUM AND PIPERACILLIN SODIUM SCH MLS/HR: 375; 3 INJECTION, SOLUTION INTRAVENOUS at 00:04

## 2019-01-12 RX ADMIN — LISINOPRIL SCH MG: 5 TABLET ORAL at 08:51

## 2019-01-12 RX ADMIN — IBUPROFEN SCH MG: 600 TABLET ORAL at 08:40

## 2019-01-12 RX ADMIN — VANCOMYCIN HYDROCHLORIDE SCH MLS/HR: 1 INJECTION, SOLUTION INTRAVENOUS at 00:04

## 2019-01-12 RX ADMIN — INSULIN GLARGINE SCH UNITS: 100 INJECTION, SOLUTION SUBCUTANEOUS at 08:47

## 2019-01-12 RX ADMIN — LISINOPRIL SCH MG: 5 TABLET ORAL at 08:43

## 2019-01-12 RX ADMIN — SODIUM CHLORIDE SCH MLS/HR: 9 INJECTION, SOLUTION INTRAVENOUS at 04:35

## 2019-01-12 RX ADMIN — Medication SCH DEV: at 05:40

## 2019-01-12 RX ADMIN — INSULIN LISPRO PRN UNITS: 100 INJECTION, SOLUTION INTRAVENOUS; SUBCUTANEOUS at 05:40

## 2019-01-12 NOTE — NUR
PATIENT SITTING IN BED WATCHING TV. NO DISTRESS NOTED. DENIES ANY PAIN. SCHEDULED 
MEDICATIONS AND VACCINES REQUESTED GIVEN. WILL CONTINUE TO MONITOR.

## 2019-01-12 NOTE — NUR
PATIENT SITTING IN BED WATCHING TV. NO DISTRESS NOTED. DENIES ANY PAIN. SCHEDULED 
MEDICATIONS DUE GIVEN. WILL CONTINUE TO MONITOR.

## 2019-01-12 NOTE — NUR
PATIENT ALL DRESSED UP AND READY TO GO HOME. ESCORTED PATIENT DOWN TO LOBBY WITH . 
PATIENT DISCHARGED AT THIS TIME TO HOME IN PRIVATE VEHICLE IN STABLE CONDITION.

## 2019-01-12 NOTE — NUR
RECEIVED REPORT FROM NIGHT SHIFT NURSE. PATIENT LYING DOWN IN BED SLEEPING, AROUSABLE BY 
VOICE. NO DISTRESS NOTED. DENIES ANY PAIN AT THIS TIME. AAOX4, CALM, COOPERATIVE, SKIN COLOR 
APPROPRIATE TO ETHNICITY, WARM TO TOUCH. HAS RLE CELLULITIS WITH MILD SWELLING AND REDNESS, 
HOWEVER, SKIN IS INTACT. ABDOMEN SOFT, NON-DISTENDED. IV SITE INTACT, PATENT, AND INFUSING 
IVF AS PER MD ORDERS. SAFETY MEASURES IN PLACE, CALL LIGHT WITHIN REACH. WILL CONTINUE TO 
MONITOR.

## 2019-01-12 NOTE — NUR
DISCHARGE INSTRUCTIONS PROVIDED TO PATIENT/ AT BEDSIDE IN Kyrgyz USING A  
#[id number]. INSTRUCTIONS REGARDING NEW/CHANGED MEDICATION REGIMEN, FOLLOW-UP VISIT WITH PCP, 
DIET REGIMEN, DIABETIC EDUCATION PROVIDED. ANSWERED ALL OF PATIENT/ QUESTIONS 
REGARDING DISCHARGE. PATIENT  VERBALIZED COMPLETE UNDERSTANDING. IV SITE REMOVED WITH 
MINIMAL BLOOD AND LUMEN COMPLETELY INTACT. ID BANDS REMOVED. PATIENT TO GET DRESSED AND THEN 
READY TO GO HOME WITH . WILL CONTINUE TO MONITOR.

## 2019-01-12 NOTE — NUR
PATIENT SITTING IN BED TALKING ON THE PHONE. NO DISTRESS NOTED. DENIES ANY PAIN AT THIS 
TIME. SCHEDULED MEDICATIONS DUE GIVEN. WILL CONTINUE TO MONITOR.

## 2019-01-12 NOTE — NUR
PT STATED FEELING REALLY HOT AND THIRSTY, CHECKED PT BLOOD SUGAR 277, PT THEN AMBULATED TO 
RESTROOM AND BACK TO BED. PT STATED HAVING PAIN, PT CRYING, DR. BARTHOLOMEW NOTIFIED REGARDING PT 
PAIN OF 9/10, TORADOL ORDER FOR TEMP INSTEAD OF PAIN,  STATED UNDERSTANDING AND OK TO 
TORADOL FOR PAIN FOR PT.

## 2019-01-12 NOTE — NUR
PATIENT LYING DOWN IN BED SLEEPING, AROUSABLE BY VOICE. NO DISTRESS NOTED. DENIES ANY PAIN. 
SCHEDULED MEDICATIONS DUE GIVEN. WILL CONTINUE TO MONITOR.

## 2019-01-16 ENCOUNTER — HOSPITAL ENCOUNTER (INPATIENT)
Dept: HOSPITAL 26 - MED | Age: 44
LOS: 4 days | Discharge: HOME | DRG: 383 | End: 2019-01-20
Attending: GENERAL PRACTICE | Admitting: GENERAL PRACTICE
Payer: MEDICAID

## 2019-01-16 VITALS — WEIGHT: 181 LBS | HEIGHT: 63 IN | BODY MASS INDEX: 32.07 KG/M2

## 2019-01-16 VITALS — DIASTOLIC BLOOD PRESSURE: 63 MMHG | SYSTOLIC BLOOD PRESSURE: 139 MMHG

## 2019-01-16 VITALS — DIASTOLIC BLOOD PRESSURE: 80 MMHG | SYSTOLIC BLOOD PRESSURE: 138 MMHG

## 2019-01-16 DIAGNOSIS — D47.3: ICD-10-CM

## 2019-01-16 DIAGNOSIS — Z79.4: ICD-10-CM

## 2019-01-16 DIAGNOSIS — E83.39: ICD-10-CM

## 2019-01-16 DIAGNOSIS — Z71.3: ICD-10-CM

## 2019-01-16 DIAGNOSIS — L03.115: Primary | ICD-10-CM

## 2019-01-16 DIAGNOSIS — Z82.49: ICD-10-CM

## 2019-01-16 DIAGNOSIS — E66.9: ICD-10-CM

## 2019-01-16 DIAGNOSIS — Z79.899: ICD-10-CM

## 2019-01-16 DIAGNOSIS — N92.0: ICD-10-CM

## 2019-01-16 DIAGNOSIS — E11.65: ICD-10-CM

## 2019-01-16 DIAGNOSIS — Z83.3: ICD-10-CM

## 2019-01-16 DIAGNOSIS — F15.10: ICD-10-CM

## 2019-01-16 DIAGNOSIS — K59.00: ICD-10-CM

## 2019-01-16 DIAGNOSIS — E83.42: ICD-10-CM

## 2019-01-16 DIAGNOSIS — D50.9: ICD-10-CM

## 2019-01-16 DIAGNOSIS — E43: ICD-10-CM

## 2019-01-16 DIAGNOSIS — L02.415: ICD-10-CM

## 2019-01-16 DIAGNOSIS — E78.5: ICD-10-CM

## 2019-01-16 LAB
ALBUMIN FLD-MCNC: 2.1 G/DL (ref 3.4–5)
ANION GAP SERPL CALCULATED.3IONS-SCNC: 12.5 MMOL/L (ref 8–16)
APPEARANCE UR: (no result)
AST SERPL-CCNC: 10 U/L (ref 15–37)
BARBITURATES UR QL SCN: (no result) NG/ML
BASOPHILS # BLD AUTO: 0 K/UL (ref 0–0.22)
BASOPHILS NFR BLD AUTO: 0.5 % (ref 0–2)
BENZODIAZ UR QL SCN: (no result) NG/ML
BILIRUB SERPL-MCNC: 0.1 MG/DL (ref 0–1)
BILIRUB UR QL STRIP: NEGATIVE
BUN SERPL-MCNC: 15 MG/DL (ref 7–18)
BZE UR QL SCN: (no result) NG/ML
CANNABINOIDS UR QL SCN: (no result) NG/ML
CHLORIDE SERPL-SCNC: 99 MMOL/L (ref 98–107)
CO2 SERPL-SCNC: 29.8 MMOL/L (ref 21–32)
COLOR UR: YELLOW
CREAT SERPL-MCNC: 0.7 MG/DL (ref 0.6–1.3)
EOSINOPHIL # BLD AUTO: 0.3 K/UL (ref 0–0.4)
EOSINOPHIL NFR BLD AUTO: 2.8 % (ref 0–4)
ERYTHROCYTE [DISTWIDTH] IN BLOOD BY AUTOMATED COUNT: 22.7 % (ref 11.6–13.7)
GFR SERPL CREATININE-BSD FRML MDRD: 117 ML/MIN (ref 90–?)
GLUCOSE SERPL-MCNC: 77 MG/DL (ref 74–106)
GLUCOSE UR STRIP-MCNC: NEGATIVE MG/DL
HCT VFR BLD AUTO: 25 % (ref 36–48)
HGB BLD-MCNC: 7.5 G/DL (ref 12–16)
HGB UR QL STRIP: (no result)
LEUKOCYTE ESTERASE UR QL STRIP: NEGATIVE
LYMPHOCYTES # BLD AUTO: 1.7 K/UL (ref 2.5–16.5)
LYMPHOCYTES NFR BLD AUTO: 17.1 % (ref 20.5–51.1)
MAGNESIUM SERPL-MCNC: 1.6 MG/DL (ref 1.8–2.4)
MCH RBC QN AUTO: 21 PG (ref 27–31)
MCHC RBC AUTO-ENTMCNC: 30 G/DL (ref 33–37)
MCV RBC AUTO: 69.7 FL (ref 80–94)
MONOCYTES # BLD AUTO: 0.7 K/UL (ref 0.8–1)
MONOCYTES NFR BLD AUTO: 7 % (ref 1.7–9.3)
NEUTROPHILS # BLD AUTO: 7.2 K/UL (ref 1.8–7.7)
NEUTROPHILS NFR BLD AUTO: 72.6 % (ref 42.2–75.2)
NITRITE UR QL STRIP: NEGATIVE
OPIATES UR QL SCN: (no result) NG/ML
PCP UR QL SCN: (no result) NG/ML
PH UR STRIP: 6 [PH] (ref 5–9)
PHOSPHATE SERPL-MCNC: 5.7 MG/DL (ref 2.5–4.9)
PLATELET # BLD AUTO: 757 K/UL (ref 140–450)
POTASSIUM SERPL-SCNC: 4.3 MMOL/L (ref 3.5–5.1)
PROTHROMBIN TIME: 9.8 SECS (ref 10.8–13.4)
RBC # BLD AUTO: 3.59 MIL/UL (ref 4.2–5.4)
RBC #/AREA URNS HPF: (no result) /HPF (ref 0–5)
SODIUM SERPL-SCNC: 137 MMOL/L (ref 136–145)
WBC # BLD AUTO: 9.9 K/UL (ref 4.8–10.8)
WBC,URINE: (no result) /HPF (ref 0–5)

## 2019-01-16 PROCEDURE — A6248 HYDROGEL DRSG GEL FILLER: HCPCS

## 2019-01-16 RX ADMIN — SODIUM CHLORIDE SCH MLS/HR: 0.9 INJECTION, SOLUTION INTRAVENOUS at 19:55

## 2019-01-16 RX ADMIN — Medication SCH DEV: at 21:50

## 2019-01-16 RX ADMIN — PSYLLIUM HUSK SCH GM: 3.4 POWDER ORAL at 21:48

## 2019-01-16 RX ADMIN — OXYCODONE HYDROCHLORIDE AND ACETAMINOPHEN SCH MG: 500 TABLET ORAL at 21:49

## 2019-01-16 NOTE — NUR
42 YO F BIB FAMILY AFTER PMD TOLD HER TO COME BACK TO ER FOR RE-EVALUATION OF 
HER CELLULITIS OF RIGHT LOWER LEG. PT HAS BEEN ON KEFLEX AND BACTRIM X 5 DAYS. 
PT DENIES ANY IMPROVEMENT. PAIN 10/10. DENIES N/V/D/FEVER.

## 2019-01-16 NOTE — NUR
Patient will be admitted to care of DR OLSEN. Admited to MED SURG.  Will go to 
room 120-A. Belongings list completed.  Report to BRIANA ROD.

## 2019-01-16 NOTE — NUR
RECEIVED REPORT FROM EMERGENCY ROOM NURSE. PT IN STABLE CONDITION. RESPIRATIONS EVEN AND 
UNLABORED. IV INTACT AND PATENT. SAFETY MEASURES IN PLACE. CALL LIGHT AT BEDSIDE. BED IN LOW 
POSITION. WILL CONTINUE TO MONITOR.

## 2019-01-16 NOTE — NUR
RECD. RESTING IN BED, AWAKE, A/OX4, RESPIRATION EVEN AND UNLABORED. IV OF NS INFUSING AT 60 
ML/HR, RIGHT AC G20. RIGHT LEG WITH REDNESS AND SWELLING, ELEVATED ON PILLOWS. PLAN OF CARE 
FOR THE SHIFT DISCUSSED. VERBALIZED UNDERSTANDING. PAIN IN THE LEG , STATED "A LITTLE", 
1/10. WILL ,MEDICATE AS ORDERED. ASSISTED TO BR, WITH DIFFICULTY AMBULATING DUE TO SWELLING 
IN THE LEG. WILL ORDER BEDSIDE COMMODE.

## 2019-01-17 VITALS — DIASTOLIC BLOOD PRESSURE: 62 MMHG | SYSTOLIC BLOOD PRESSURE: 127 MMHG

## 2019-01-17 VITALS — DIASTOLIC BLOOD PRESSURE: 73 MMHG | SYSTOLIC BLOOD PRESSURE: 127 MMHG

## 2019-01-17 VITALS — DIASTOLIC BLOOD PRESSURE: 77 MMHG | SYSTOLIC BLOOD PRESSURE: 140 MMHG

## 2019-01-17 VITALS — SYSTOLIC BLOOD PRESSURE: 120 MMHG | DIASTOLIC BLOOD PRESSURE: 54 MMHG

## 2019-01-17 VITALS — DIASTOLIC BLOOD PRESSURE: 63 MMHG | SYSTOLIC BLOOD PRESSURE: 120 MMHG

## 2019-01-17 VITALS — DIASTOLIC BLOOD PRESSURE: 78 MMHG | SYSTOLIC BLOOD PRESSURE: 132 MMHG

## 2019-01-17 VITALS — SYSTOLIC BLOOD PRESSURE: 116 MMHG | DIASTOLIC BLOOD PRESSURE: 71 MMHG

## 2019-01-17 LAB
ANION GAP SERPL CALCULATED.3IONS-SCNC: 11.3 MMOL/L (ref 8–16)
BASOPHILS # BLD AUTO: 0.1 K/UL (ref 0–0.22)
BASOPHILS NFR BLD AUTO: 0.6 % (ref 0–2)
BUN SERPL-MCNC: 13 MG/DL (ref 7–18)
CHLORIDE SERPL-SCNC: 101 MMOL/L (ref 98–107)
CO2 SERPL-SCNC: 29.1 MMOL/L (ref 21–32)
CREAT SERPL-MCNC: 0.7 MG/DL (ref 0.6–1.3)
EOSINOPHIL # BLD AUTO: 0.2 K/UL (ref 0–0.4)
EOSINOPHIL NFR BLD AUTO: 2.6 % (ref 0–4)
ERYTHROCYTE [DISTWIDTH] IN BLOOD BY AUTOMATED COUNT: 23.2 % (ref 11.6–13.7)
GFR SERPL CREATININE-BSD FRML MDRD: 117 ML/MIN (ref 90–?)
GLUCOSE SERPL-MCNC: 186 MG/DL (ref 74–106)
HCT VFR BLD AUTO: 23.5 % (ref 36–48)
HGB BLD-MCNC: 7.1 G/DL (ref 12–16)
LYMPHOCYTES # BLD AUTO: 1.4 K/UL (ref 2.5–16.5)
LYMPHOCYTES NFR BLD AUTO: 15.8 % (ref 20.5–51.1)
MAGNESIUM SERPL-MCNC: 1.9 MG/DL (ref 1.8–2.4)
MCH RBC QN AUTO: 21 PG (ref 27–31)
MCHC RBC AUTO-ENTMCNC: 30 G/DL (ref 33–37)
MCV RBC AUTO: 69.5 FL (ref 80–94)
MONOCYTES # BLD AUTO: 0.6 K/UL (ref 0.8–1)
MONOCYTES NFR BLD AUTO: 7.4 % (ref 1.7–9.3)
NEUTROPHILS # BLD AUTO: 6.4 K/UL (ref 1.8–7.7)
NEUTROPHILS NFR BLD AUTO: 73.6 % (ref 42.2–75.2)
PHOSPHATE SERPL-MCNC: 4.7 MG/DL (ref 2.5–4.9)
PLATELET # BLD AUTO: 706 K/UL (ref 140–450)
POTASSIUM SERPL-SCNC: 4.4 MMOL/L (ref 3.5–5.1)
RBC # BLD AUTO: 3.38 MIL/UL (ref 4.2–5.4)
SODIUM SERPL-SCNC: 137 MMOL/L (ref 136–145)
WBC # BLD AUTO: 8.7 K/UL (ref 4.8–10.8)

## 2019-01-17 PROCEDURE — 0JBN0ZZ EXCISION OF RIGHT LOWER LEG SUBCUTANEOUS TISSUE AND FASCIA, OPEN APPROACH: ICD-10-PCS

## 2019-01-17 RX ADMIN — INSULIN GLARGINE SCH UNITS: 100 INJECTION, SOLUTION SUBCUTANEOUS at 20:49

## 2019-01-17 RX ADMIN — HYDROCODONE BITARTRATE AND ACETAMINOPHEN PRN TAB: 5; 325 TABLET ORAL at 00:34

## 2019-01-17 RX ADMIN — HYDROCODONE BITARTRATE AND ACETAMINOPHEN PRN TAB: 5; 325 TABLET ORAL at 05:22

## 2019-01-17 RX ADMIN — TAZOBACTAM SODIUM AND PIPERACILLIN SODIUM SCH MLS/HR: 375; 3 INJECTION, SOLUTION INTRAVENOUS at 05:54

## 2019-01-17 RX ADMIN — Medication SCH DEV: at 16:38

## 2019-01-17 RX ADMIN — Medication SCH DEV: at 20:37

## 2019-01-17 RX ADMIN — FERROUS SULFATE TAB 325 MG (65 MG ELEMENTAL FE) SCH MG: 325 (65 FE) TAB at 17:06

## 2019-01-17 RX ADMIN — VANCOMYCIN HYDROCHLORIDE SCH MLS/HR: 1 INJECTION, SOLUTION INTRAVENOUS at 12:18

## 2019-01-17 RX ADMIN — OXYCODONE HYDROCHLORIDE AND ACETAMINOPHEN SCH MG: 500 TABLET ORAL at 08:39

## 2019-01-17 RX ADMIN — PSYLLIUM HUSK SCH GM: 3.4 POWDER ORAL at 20:43

## 2019-01-17 RX ADMIN — SODIUM CHLORIDE SCH MLS/HR: 0.9 INJECTION, SOLUTION INTRAVENOUS at 01:36

## 2019-01-17 RX ADMIN — TAZOBACTAM SODIUM AND PIPERACILLIN SODIUM SCH MLS/HR: 375; 3 INJECTION, SOLUTION INTRAVENOUS at 00:55

## 2019-01-17 RX ADMIN — DOCUSATE SODIUM SCH MG: 100 CAPSULE, LIQUID FILLED ORAL at 20:43

## 2019-01-17 RX ADMIN — HYDROCODONE BITARTRATE AND ACETAMINOPHEN PRN TAB: 7.5; 325 TABLET ORAL at 17:41

## 2019-01-17 RX ADMIN — Medication SCH DEV: at 07:37

## 2019-01-17 RX ADMIN — CALCIUM ACETATE SCH MG: 667 CAPSULE ORAL at 08:39

## 2019-01-17 RX ADMIN — OXYCODONE HYDROCHLORIDE AND ACETAMINOPHEN SCH MG: 500 TABLET ORAL at 20:43

## 2019-01-17 RX ADMIN — Medication SCH DEV: at 11:30

## 2019-01-17 RX ADMIN — TAZOBACTAM SODIUM AND PIPERACILLIN SODIUM SCH MLS/HR: 375; 3 INJECTION, SOLUTION INTRAVENOUS at 17:06

## 2019-01-17 RX ADMIN — HYDROCODONE BITARTRATE AND ACETAMINOPHEN PRN TAB: 5; 325 TABLET ORAL at 09:01

## 2019-01-17 RX ADMIN — TAZOBACTAM SODIUM AND PIPERACILLIN SODIUM SCH MLS/HR: 375; 3 INJECTION, SOLUTION INTRAVENOUS at 12:00

## 2019-01-17 RX ADMIN — PSYLLIUM HUSK SCH GM: 3.4 POWDER ORAL at 08:40

## 2019-01-17 NOTE — NUR
RECEIVED PATIENT AWAKE RESTING COMFORTABLE ON BED.RESPIRATION EVEN AND UNLABORED. EXPLAINED 
PLAN OF CARE. FALL PRECAUTION APPLIED. CALL LIGHT WITHIN REACH. ENCOURAGED PATIENT TO USE 
BEDSIDE COMMODE FOR SAFETY. WILL CONTINUE TO MONITOR.

## 2019-01-17 NOTE — NUR
RECEIVED BEDSIDE REPORT FROM NIGHTS SHIFT NURSE. PATIENT AAOX4. PATIENT AMBULATORY WITH 
MINIMUM ASSISTANCE, BEDSIDE COMMODE AT BEDSIDE. PATIENT ON ROOM AIR, WITH NO DISTRESS NOTED. 
IV ON R AC 20 G INFUSING NS AT 60, IV CLEAN DRY AND INTACT. SKIN INTACT EXCEPT FOR RLE 
CELLULITIS. LEG ELEVATED ON 2 PILLOWS. FALL RISK PROTOCOL IN PLACE. BED IN LOW POSITION, 
CALL LIGHT WITHIN REACH. WILL CONTINUE TO MONITOR.

## 2019-01-17 NOTE — NUR
PATIENT HAS BEEN SCREENED AND CATEGORIZED AS MODERATE NUTRITION RISK. PATIENT WILL BE SEEN 
WITHIN 3-5 DAYS OF ADMISSION.



01/19/19 01/21/19



ALEN BONE RD

## 2019-01-17 NOTE — NUR
PATIENT WHEELED BACK TO UNIT IN [facility]. OR NURSES GAVE REPORT AT BEDSIDE. VITAL SIGNS TAKEN. 
WILL CONTINUE TO MONITOR.

## 2019-01-18 VITALS — SYSTOLIC BLOOD PRESSURE: 122 MMHG | DIASTOLIC BLOOD PRESSURE: 59 MMHG

## 2019-01-18 VITALS — DIASTOLIC BLOOD PRESSURE: 78 MMHG | SYSTOLIC BLOOD PRESSURE: 151 MMHG

## 2019-01-18 VITALS — DIASTOLIC BLOOD PRESSURE: 62 MMHG | SYSTOLIC BLOOD PRESSURE: 112 MMHG

## 2019-01-18 LAB
ANION GAP SERPL CALCULATED.3IONS-SCNC: 11.2 MMOL/L (ref 8–16)
BASOPHILS # BLD AUTO: 0.1 K/UL (ref 0–0.22)
BASOPHILS NFR BLD AUTO: 0.9 % (ref 0–2)
BUN SERPL-MCNC: 12 MG/DL (ref 7–18)
CHLORIDE SERPL-SCNC: 102 MMOL/L (ref 98–107)
CO2 SERPL-SCNC: 29 MMOL/L (ref 21–32)
CREAT SERPL-MCNC: 0.8 MG/DL (ref 0.6–1.3)
EOSINOPHIL # BLD AUTO: 0.2 K/UL (ref 0–0.4)
EOSINOPHIL NFR BLD AUTO: 3 % (ref 0–4)
ERYTHROCYTE [DISTWIDTH] IN BLOOD BY AUTOMATED COUNT: 23.4 % (ref 11.6–13.7)
GFR SERPL CREATININE-BSD FRML MDRD: 101 ML/MIN (ref 90–?)
GLUCOSE SERPL-MCNC: 152 MG/DL (ref 74–106)
HCT VFR BLD AUTO: 25 % (ref 36–48)
HGB BLD-MCNC: 7.5 G/DL (ref 12–16)
IRON SERPL-MCNC: 42 UG/DL (ref 35–150)
LYMPHOCYTES # BLD AUTO: 1.5 K/UL (ref 2.5–16.5)
LYMPHOCYTES NFR BLD AUTO: 21.8 % (ref 20.5–51.1)
MAGNESIUM SERPL-MCNC: 1.8 MG/DL (ref 1.8–2.4)
MCH RBC QN AUTO: 21 PG (ref 27–31)
MCHC RBC AUTO-ENTMCNC: 30 G/DL (ref 33–37)
MCV RBC AUTO: 70.5 FL (ref 80–94)
MONOCYTES # BLD AUTO: 0.6 K/UL (ref 0.8–1)
MONOCYTES NFR BLD AUTO: 8.1 % (ref 1.7–9.3)
NEUTROPHILS # BLD AUTO: 4.7 K/UL (ref 1.8–7.7)
NEUTROPHILS NFR BLD AUTO: 66.2 % (ref 42.2–75.2)
PHOSPHATE SERPL-MCNC: 4.6 MG/DL (ref 2.5–4.9)
PLATELET # BLD AUTO: 754 K/UL (ref 140–450)
POTASSIUM SERPL-SCNC: 4.2 MMOL/L (ref 3.5–5.1)
RBC # BLD AUTO: 3.55 MIL/UL (ref 4.2–5.4)
SODIUM SERPL-SCNC: 138 MMOL/L (ref 136–145)
WBC # BLD AUTO: 7.1 K/UL (ref 4.8–10.8)

## 2019-01-18 RX ADMIN — HYDROCODONE BITARTRATE AND ACETAMINOPHEN PRN TAB: 7.5; 325 TABLET ORAL at 23:18

## 2019-01-18 RX ADMIN — PSYLLIUM HUSK SCH GM: 3.4 POWDER ORAL at 09:00

## 2019-01-18 RX ADMIN — TAZOBACTAM SODIUM AND PIPERACILLIN SODIUM SCH MLS/HR: 375; 3 INJECTION, SOLUTION INTRAVENOUS at 18:31

## 2019-01-18 RX ADMIN — PSYLLIUM HUSK SCH GM: 3.4 POWDER ORAL at 20:15

## 2019-01-18 RX ADMIN — VANCOMYCIN HYDROCHLORIDE SCH MLS/HR: 1 INJECTION, SOLUTION INTRAVENOUS at 00:13

## 2019-01-18 RX ADMIN — HYDROCODONE BITARTRATE AND ACETAMINOPHEN PRN TAB: 7.5; 325 TABLET ORAL at 00:16

## 2019-01-18 RX ADMIN — FERROUS SULFATE TAB 325 MG (65 MG ELEMENTAL FE) SCH MG: 325 (65 FE) TAB at 17:51

## 2019-01-18 RX ADMIN — Medication SCH DEV: at 11:30

## 2019-01-18 RX ADMIN — HYDROCODONE BITARTRATE AND ACETAMINOPHEN PRN TAB: 7.5; 325 TABLET ORAL at 05:13

## 2019-01-18 RX ADMIN — SODIUM CHLORIDE SCH MLS/HR: 9 INJECTION, SOLUTION INTRAVENOUS at 17:51

## 2019-01-18 RX ADMIN — INSULIN LISPRO PRN UNITS: 100 INJECTION, SOLUTION INTRAVENOUS; SUBCUTANEOUS at 17:55

## 2019-01-18 RX ADMIN — VANCOMYCIN HYDROCHLORIDE SCH MLS/HR: 1 INJECTION, SOLUTION INTRAVENOUS at 23:59

## 2019-01-18 RX ADMIN — OXYCODONE HYDROCHLORIDE AND ACETAMINOPHEN SCH MG: 500 TABLET ORAL at 08:58

## 2019-01-18 RX ADMIN — DOCUSATE SODIUM SCH MG: 100 CAPSULE, LIQUID FILLED ORAL at 20:16

## 2019-01-18 RX ADMIN — Medication SCH DEV: at 06:34

## 2019-01-18 RX ADMIN — INSULIN GLARGINE SCH UNITS: 100 INJECTION, SOLUTION SUBCUTANEOUS at 20:10

## 2019-01-18 RX ADMIN — LISINOPRIL SCH MG: 5 TABLET ORAL at 08:59

## 2019-01-18 RX ADMIN — TAZOBACTAM SODIUM AND PIPERACILLIN SODIUM SCH MLS/HR: 375; 3 INJECTION, SOLUTION INTRAVENOUS at 05:10

## 2019-01-18 RX ADMIN — SODIUM CHLORIDE PRN MG: 9 INJECTION, SOLUTION INTRAVENOUS at 14:15

## 2019-01-18 RX ADMIN — DOCUSATE SODIUM SCH MG: 100 CAPSULE, LIQUID FILLED ORAL at 08:58

## 2019-01-18 RX ADMIN — OXYCODONE HYDROCHLORIDE AND ACETAMINOPHEN SCH MG: 500 TABLET ORAL at 20:16

## 2019-01-18 RX ADMIN — Medication SCH DEV: at 20:17

## 2019-01-18 RX ADMIN — SODIUM CHLORIDE SCH MLS/HR: 9 INJECTION, SOLUTION INTRAVENOUS at 23:22

## 2019-01-18 RX ADMIN — CALCIUM ACETATE SCH MG: 667 CAPSULE ORAL at 08:59

## 2019-01-18 RX ADMIN — FERROUS SULFATE TAB 325 MG (65 MG ELEMENTAL FE) SCH MG: 325 (65 FE) TAB at 08:59

## 2019-01-18 RX ADMIN — SODIUM CHLORIDE SCH MLS/HR: 9 INJECTION, SOLUTION INTRAVENOUS at 07:04

## 2019-01-18 RX ADMIN — TAZOBACTAM SODIUM AND PIPERACILLIN SODIUM SCH MLS/HR: 375; 3 INJECTION, SOLUTION INTRAVENOUS at 14:15

## 2019-01-18 RX ADMIN — Medication SCH DEV: at 16:30

## 2019-01-18 RX ADMIN — TAZOBACTAM SODIUM AND PIPERACILLIN SODIUM SCH MLS/HR: 375; 3 INJECTION, SOLUTION INTRAVENOUS at 23:08

## 2019-01-18 RX ADMIN — INSULIN LISPRO PRN UNITS: 100 INJECTION, SOLUTION INTRAVENOUS; SUBCUTANEOUS at 06:32

## 2019-01-18 RX ADMIN — TAZOBACTAM SODIUM AND PIPERACILLIN SODIUM SCH MLS/HR: 375; 3 INJECTION, SOLUTION INTRAVENOUS at 00:09

## 2019-01-18 RX ADMIN — Medication SCH EACH: at 08:59

## 2019-01-18 RX ADMIN — VANCOMYCIN HYDROCHLORIDE SCH MLS/HR: 1 INJECTION, SOLUTION INTRAVENOUS at 14:16

## 2019-01-18 RX ADMIN — SODIUM CHLORIDE PRN MG: 9 INJECTION, SOLUTION INTRAVENOUS at 09:19

## 2019-01-18 NOTE — NUR
RECEIVED BEDSIDE REPORT FROM NIGHT SHIFT NURSE. PATIENT AAOX4. PATIENT ON ROOM AIR, NO 
DISTRESS NOTED. PATIENT AMBULATES WITH ASSIST. IV ON R AC 20 G INFUSING NS . IV CLEAN 
DRY AND INTACT. BED IN LOW POSITION, CALL LIGHT WITHIN REACH. WILL CONTINUE TO MONITOR.

## 2019-01-18 NOTE — NUR
PATIENT AMBULATED TO BATHROOM WITH CNA ASSISTANCE. PATIENT TOLERATED WELL. WILL CONTINUE TO 
MONITOR.

## 2019-01-18 NOTE — NUR
RECEIVED PATIENT LYING COMFORTABLE ON BED. EXPLAINED PLAN OF CARE AND VERBALIZED 
UNDERSTANDING. REPOSITIONED PATIENT AND PLACED IN COMFORTABLE POSITION. FALL PRECAUTION 
APPLIED. CALL LIGHT WITHIN REACH.

## 2019-01-18 NOTE — NUR
SCHEDULE MEDICATION GIVEN AND TOLERATED WELL. ENCOURAGED PATIENT TO USE CALL LIGHT FOR 
ASSISTANCE OR USED BEDSIDE COMMODE. NO S/S OF DISTRESS NOTED. WILL CONTINUE TO MONITOR.

## 2019-01-18 NOTE — NUR
V/S TAKEN AND RECORDED. SCHEDULE MEDICATION GIVEN TOLERATED WELL. SEEN PATIENT RESTING. CALL 
LIGHT WITHIN REACH. ALL NEEDS ATTENDED. NO S/S OF DISTRESS NOTED AT THIS TIME. WILL CONTINUE 
TO MONITOR.

## 2019-01-18 NOTE — NUR
WOUND CARE EVALUATION NOTE: S/P I&D RLL ABSCESS



RIGHT LATERAL LOWER EXTREMITY SURGICAL WOUND 2X1X0.5CM, WOUND BED IS RED WITH MODERATE 
AMOUNT SEROSANGUINEOUS DRAINAGE, NO ODOR, YEN-WOUND WITH ERYTHEMA AND YEN WOUND SKIN DRY 
AND PEELING, PAIN 2/10 PT. TOLERATE PROCEDURES WELL. WOUND CARE PROCEDURES EXPLAINED , ALL 
QUESTION ANSWERED.



RECOMMENDATION:

-CLEANSE RIGHT LOWER LEG SURGICAL WOUND WITH NS. PAT DRY, PACK WOUND WITH HYDROGEL WITH OIL 
EMULSION (ADAPTIC) DRESSING, COVER WITH DRY DRESSING AND WRAP WITH KERLIX ROLLS QD AND PRN 
IF SOILING.



ALL ABOVE INFORMATION SPOKE TO PT. WITH STAFF JACKSON AT BEDSIDE ASSIST TRANSLATING IN 
Colombian.

## 2019-01-19 VITALS — SYSTOLIC BLOOD PRESSURE: 136 MMHG | DIASTOLIC BLOOD PRESSURE: 71 MMHG

## 2019-01-19 VITALS — SYSTOLIC BLOOD PRESSURE: 150 MMHG | DIASTOLIC BLOOD PRESSURE: 81 MMHG

## 2019-01-19 VITALS — SYSTOLIC BLOOD PRESSURE: 129 MMHG | DIASTOLIC BLOOD PRESSURE: 57 MMHG

## 2019-01-19 LAB
ANION GAP SERPL CALCULATED.3IONS-SCNC: 8.2 MMOL/L (ref 8–16)
BASOPHILS # BLD AUTO: 0 K/UL (ref 0–0.22)
BASOPHILS NFR BLD AUTO: 0.9 % (ref 0–2)
BUN SERPL-MCNC: 9 MG/DL (ref 7–18)
CHLORIDE SERPL-SCNC: 105 MMOL/L (ref 98–107)
CO2 SERPL-SCNC: 31.9 MMOL/L (ref 21–32)
CREAT SERPL-MCNC: 0.7 MG/DL (ref 0.6–1.3)
EOSINOPHIL # BLD AUTO: 0.1 K/UL (ref 0–0.4)
EOSINOPHIL NFR BLD AUTO: 2.5 % (ref 0–4)
ERYTHROCYTE [DISTWIDTH] IN BLOOD BY AUTOMATED COUNT: 23.6 % (ref 11.6–13.7)
GFR SERPL CREATININE-BSD FRML MDRD: 117 ML/MIN (ref 90–?)
GLUCOSE SERPL-MCNC: 88 MG/DL (ref 74–106)
HCT VFR BLD AUTO: 24.2 % (ref 36–48)
HGB BLD-MCNC: 7.3 G/DL (ref 12–16)
LYMPHOCYTES # BLD AUTO: 1.5 K/UL (ref 2.5–16.5)
LYMPHOCYTES NFR BLD AUTO: 30.1 % (ref 20.5–51.1)
MAGNESIUM SERPL-MCNC: 1.7 MG/DL (ref 1.8–2.4)
MCH RBC QN AUTO: 21 PG (ref 27–31)
MCHC RBC AUTO-ENTMCNC: 30 G/DL (ref 33–37)
MCV RBC AUTO: 71.4 FL (ref 80–94)
MONOCYTES # BLD AUTO: 0.6 K/UL (ref 0.8–1)
MONOCYTES NFR BLD AUTO: 11.2 % (ref 1.7–9.3)
NEUTROPHILS # BLD AUTO: 2.8 K/UL (ref 1.8–7.7)
NEUTROPHILS NFR BLD AUTO: 55.3 % (ref 42.2–75.2)
PHOSPHATE SERPL-MCNC: 4.8 MG/DL (ref 2.5–4.9)
PLATELET # BLD AUTO: 617 K/UL (ref 140–450)
POTASSIUM SERPL-SCNC: 4.1 MMOL/L (ref 3.5–5.1)
RBC # BLD AUTO: 3.39 MIL/UL (ref 4.2–5.4)
SODIUM SERPL-SCNC: 141 MMOL/L (ref 136–145)
WBC # BLD AUTO: 5 K/UL (ref 4.8–10.8)

## 2019-01-19 RX ADMIN — Medication SCH GEL: at 09:10

## 2019-01-19 RX ADMIN — TAZOBACTAM SODIUM AND PIPERACILLIN SODIUM SCH MLS/HR: 375; 3 INJECTION, SOLUTION INTRAVENOUS at 05:10

## 2019-01-19 RX ADMIN — DOCUSATE SODIUM SCH MG: 100 CAPSULE, LIQUID FILLED ORAL at 09:05

## 2019-01-19 RX ADMIN — FERROUS SULFATE TAB 325 MG (65 MG ELEMENTAL FE) SCH MG: 325 (65 FE) TAB at 17:27

## 2019-01-19 RX ADMIN — Medication SCH DEV: at 21:14

## 2019-01-19 RX ADMIN — TAZOBACTAM SODIUM AND PIPERACILLIN SODIUM SCH MLS/HR: 375; 3 INJECTION, SOLUTION INTRAVENOUS at 18:23

## 2019-01-19 RX ADMIN — DOCUSATE SODIUM SCH MG: 100 CAPSULE, LIQUID FILLED ORAL at 21:14

## 2019-01-19 RX ADMIN — SODIUM CHLORIDE SCH MLS/HR: 9 INJECTION, SOLUTION INTRAVENOUS at 22:29

## 2019-01-19 RX ADMIN — SODIUM CHLORIDE SCH MLS/HR: 9 INJECTION, SOLUTION INTRAVENOUS at 13:00

## 2019-01-19 RX ADMIN — Medication SCH DEV: at 06:35

## 2019-01-19 RX ADMIN — HYDROCODONE BITARTRATE AND ACETAMINOPHEN PRN TAB: 7.5; 325 TABLET ORAL at 09:24

## 2019-01-19 RX ADMIN — Medication SCH DEV: at 12:27

## 2019-01-19 RX ADMIN — OXYCODONE HYDROCHLORIDE AND ACETAMINOPHEN SCH MG: 500 TABLET ORAL at 21:12

## 2019-01-19 RX ADMIN — TAZOBACTAM SODIUM AND PIPERACILLIN SODIUM SCH MLS/HR: 375; 3 INJECTION, SOLUTION INTRAVENOUS at 16:11

## 2019-01-19 RX ADMIN — SODIUM CHLORIDE SCH ML: 900 IRRIGANT IRRIGATION at 09:06

## 2019-01-19 RX ADMIN — PSYLLIUM HUSK SCH GM: 3.4 POWDER ORAL at 21:14

## 2019-01-19 RX ADMIN — OXYCODONE HYDROCHLORIDE AND ACETAMINOPHEN SCH MG: 500 TABLET ORAL at 09:05

## 2019-01-19 RX ADMIN — Medication SCH EACH: at 09:05

## 2019-01-19 RX ADMIN — HYDROCODONE BITARTRATE AND ACETAMINOPHEN PRN TAB: 7.5; 325 TABLET ORAL at 06:40

## 2019-01-19 RX ADMIN — PSYLLIUM HUSK SCH GM: 3.4 POWDER ORAL at 09:07

## 2019-01-19 RX ADMIN — FERROUS SULFATE TAB 325 MG (65 MG ELEMENTAL FE) SCH MG: 325 (65 FE) TAB at 09:05

## 2019-01-19 RX ADMIN — LISINOPRIL SCH MG: 5 TABLET ORAL at 09:06

## 2019-01-19 RX ADMIN — CALCIUM ACETATE SCH MG: 667 CAPSULE ORAL at 09:05

## 2019-01-19 RX ADMIN — SODIUM CHLORIDE PRN MG: 9 INJECTION, SOLUTION INTRAVENOUS at 21:15

## 2019-01-19 RX ADMIN — Medication SCH DEV: at 17:27

## 2019-01-19 RX ADMIN — INSULIN GLARGINE SCH UNITS: 100 INJECTION, SOLUTION SUBCUTANEOUS at 21:12

## 2019-01-19 RX ADMIN — VANCOMYCIN HYDROCHLORIDE SCH MLS/HR: 1 INJECTION, SOLUTION INTRAVENOUS at 17:24

## 2019-01-19 RX ADMIN — SODIUM CHLORIDE PRN MG: 9 INJECTION, SOLUTION INTRAVENOUS at 17:27

## 2019-01-19 NOTE — NUR
RECEIVED BEDSIDE REPORT FROM NIGHT SHIFT NURSE. PATIENT AAOX4. PATIENT ON ROOM AIR, NO 
DISTRESS NOTED. PATIENT AMBULATES WITH ASSIST. IV ON L HAND  INFUSING NS . IV CLEAN 
DRY AND INTACT. S/P I&D TO RLE CELLULITIS. DRESSING IS CLEAN, DRY, AND INTACT. PT STATES NO 
PAIN AT THIS TIME. BED IN LOW POSITION, CALL LIGHT WITHIN REACH. WILL CONTINUE TO MONITOR.

## 2019-01-19 NOTE — NUR
WOUND CHANGE DONE FOR PT. PT TOLERATED WELL. PACKED AND DRESSED PER ORDERS. NO FOUL ODOR 
NOTED. ALL OTHER PT NEEDS MET AT THIS TIME. WILL CONTINUE TO MONITOR CLOSELY. BED IN LOW 
POSITION, CALL LIGHT WITHIN REACH.

## 2019-01-19 NOTE — NUR
PATIENT EATING DINNER. PATIENT ON ROOM AIR, NO DISTRESS NOTED. WILL CONTINUE TO MONITOR. BED 
IN LOW POSITION, CALL LIGHT WITHIN REACH

## 2019-01-19 NOTE — NUR
ADMINISTERED MORNING MEDS TO PT. PT TOLERATED THEM WELL. PT STATES PAIN IN RLE. WILL 
MEDICATE WITH NORCO. ALL OTHER NEEDS MET AT THIS TIME. WILL CONTINUE TO MONITOR FREQUENTLY. 
CALL LIGHT WITHIN REACH, BED IN LOW POSITION.

## 2019-01-19 NOTE — NUR
PT C/O OF PAIN. AGAIN REITERATED PT NOT TO USE HER RIGHT LE FOR WEIGHTBEARING AMBULATING 
TOWARDS THE RESTROOM, BUT TO USE THE BEDSIDE COMMODE FOR NWB OF RIGHT LEG. WILL CONTINUE TO 
MONITOR.

## 2019-01-19 NOTE — NUR
ASSISTED PATIENT TO COMFORTABLE POSITION. NO S/S OF DISTRESS NOTED. ALL NEEDS ATTENDED. WILL 
CONTINUE TO MONITOR.

## 2019-01-19 NOTE — NUR
RECEIVED BEDSIDE REPORT FROM NIGHT SHIFT NURSE. PATIENT AAOX4. PATIENT ON ROOM AIR, NO 
DISTRESS NOTED. PATIENT AMBULATES WITH ASSIST. IV ON R AC 20 G INFUSING NS . IV CLEAN 
DRY AND INTACT. S/P I&D TO RLE CELLULITIS. DRESSING IS CLEAN, DRY, AND INTACT. PT STATES NO 
PAIN AT THIS TIME. BED IN LOW POSITION, CALL LIGHT WITHIN REACH. WILL CONTINUE TO MONITOR.

## 2019-01-19 NOTE — NUR
PT WENT TO AMBULATE W/ STANDBY ASSIST, INFORMED PT TO USE BEDSIDE COMMODE, FOR R LOWER 
EXTREMITY TO BE PLACED IN NONWEIGHTBEARING WHILE WOUND STILL OPEN. PT INFORMED OF THE RISKS 
AND BENEFITS OF NON WEIGHTBEARING ON RIGHT LEG. PT VERBALIZED UNDERSTANDING.

## 2019-01-19 NOTE — NUR
V/S TAKEN WITHIN NORMAL LIMITS. PATIENT SLEEP COMFORTABLY IN BED. CALL LIGHT WITHIN REAH. 
FALL PRECAUTION APPLIED.

## 2019-01-20 VITALS — DIASTOLIC BLOOD PRESSURE: 75 MMHG | SYSTOLIC BLOOD PRESSURE: 120 MMHG

## 2019-01-20 VITALS — DIASTOLIC BLOOD PRESSURE: 70 MMHG | SYSTOLIC BLOOD PRESSURE: 123 MMHG

## 2019-01-20 VITALS — DIASTOLIC BLOOD PRESSURE: 78 MMHG | SYSTOLIC BLOOD PRESSURE: 141 MMHG

## 2019-01-20 LAB
ANION GAP SERPL CALCULATED.3IONS-SCNC: 9.3 MMOL/L (ref 8–16)
BASOPHILS # BLD AUTO: 0 K/UL (ref 0–0.22)
BASOPHILS NFR BLD AUTO: 0.9 % (ref 0–2)
BUN SERPL-MCNC: 9 MG/DL (ref 7–18)
CHLORIDE SERPL-SCNC: 107 MMOL/L (ref 98–107)
CO2 SERPL-SCNC: 29.4 MMOL/L (ref 21–32)
CREAT SERPL-MCNC: 0.7 MG/DL (ref 0.6–1.3)
EOSINOPHIL # BLD AUTO: 0.1 K/UL (ref 0–0.4)
EOSINOPHIL NFR BLD AUTO: 2.2 % (ref 0–4)
ERYTHROCYTE [DISTWIDTH] IN BLOOD BY AUTOMATED COUNT: 23.9 % (ref 11.6–13.7)
GFR SERPL CREATININE-BSD FRML MDRD: 117 ML/MIN (ref 90–?)
GLUCOSE SERPL-MCNC: 72 MG/DL (ref 74–106)
HCT VFR BLD AUTO: 23.9 % (ref 36–48)
HGB BLD-MCNC: 7.2 G/DL (ref 12–16)
IRON SERPL-MCNC: 45 UG/DL (ref 35–150)
LYMPHOCYTES # BLD AUTO: 1.5 K/UL (ref 2.5–16.5)
LYMPHOCYTES NFR BLD AUTO: 30 % (ref 20.5–51.1)
MAGNESIUM SERPL-MCNC: 1.9 MG/DL (ref 1.8–2.4)
MCH RBC QN AUTO: 22 PG (ref 27–31)
MCHC RBC AUTO-ENTMCNC: 30 G/DL (ref 33–37)
MCV RBC AUTO: 71.3 FL (ref 80–94)
MONOCYTES # BLD AUTO: 0.5 K/UL (ref 0.8–1)
MONOCYTES NFR BLD AUTO: 10.4 % (ref 1.7–9.3)
NEUTROPHILS # BLD AUTO: 2.8 K/UL (ref 1.8–7.7)
NEUTROPHILS NFR BLD AUTO: 56.5 % (ref 42.2–75.2)
PHOSPHATE SERPL-MCNC: 4.9 MG/DL (ref 2.5–4.9)
PLATELET # BLD AUTO: 530 K/UL (ref 140–450)
POTASSIUM SERPL-SCNC: 3.7 MMOL/L (ref 3.5–5.1)
RBC # BLD AUTO: 3.35 MIL/UL (ref 4.2–5.4)
SODIUM SERPL-SCNC: 142 MMOL/L (ref 136–145)
WBC # BLD AUTO: 5 K/UL (ref 4.8–10.8)

## 2019-01-20 RX ADMIN — Medication SCH GEL: at 09:00

## 2019-01-20 RX ADMIN — OXYCODONE HYDROCHLORIDE AND ACETAMINOPHEN SCH MG: 500 TABLET ORAL at 10:06

## 2019-01-20 RX ADMIN — TAZOBACTAM SODIUM AND PIPERACILLIN SODIUM SCH MLS/HR: 375; 3 INJECTION, SOLUTION INTRAVENOUS at 11:54

## 2019-01-20 RX ADMIN — Medication SCH EACH: at 10:06

## 2019-01-20 RX ADMIN — PSYLLIUM HUSK SCH GM: 3.4 POWDER ORAL at 10:05

## 2019-01-20 RX ADMIN — SODIUM CHLORIDE SCH ML: 900 IRRIGANT IRRIGATION at 09:00

## 2019-01-20 RX ADMIN — Medication SCH DEV: at 16:30

## 2019-01-20 RX ADMIN — TAZOBACTAM SODIUM AND PIPERACILLIN SODIUM SCH MLS/HR: 375; 3 INJECTION, SOLUTION INTRAVENOUS at 05:27

## 2019-01-20 RX ADMIN — Medication SCH DEV: at 05:31

## 2019-01-20 RX ADMIN — INSULIN LISPRO PRN UNITS: 100 INJECTION, SOLUTION INTRAVENOUS; SUBCUTANEOUS at 11:55

## 2019-01-20 RX ADMIN — FERROUS SULFATE TAB 325 MG (65 MG ELEMENTAL FE) SCH MG: 325 (65 FE) TAB at 17:00

## 2019-01-20 RX ADMIN — Medication SCH DEV: at 11:48

## 2019-01-20 RX ADMIN — LISINOPRIL SCH MG: 5 TABLET ORAL at 10:07

## 2019-01-20 RX ADMIN — VANCOMYCIN HYDROCHLORIDE SCH MLS/HR: 1 INJECTION, SOLUTION INTRAVENOUS at 01:31

## 2019-01-20 RX ADMIN — SODIUM CHLORIDE SCH MLS/HR: 9 INJECTION, SOLUTION INTRAVENOUS at 11:51

## 2019-01-20 RX ADMIN — FERROUS SULFATE TAB 325 MG (65 MG ELEMENTAL FE) SCH MG: 325 (65 FE) TAB at 10:06

## 2019-01-20 RX ADMIN — CALCIUM ACETATE SCH MG: 667 CAPSULE ORAL at 10:07

## 2019-01-20 RX ADMIN — TAZOBACTAM SODIUM AND PIPERACILLIN SODIUM SCH MLS/HR: 375; 3 INJECTION, SOLUTION INTRAVENOUS at 02:08

## 2019-01-20 RX ADMIN — DOCUSATE SODIUM SCH MG: 100 CAPSULE, LIQUID FILLED ORAL at 10:06

## 2019-01-20 NOTE — NUR
RECEIVED PT FROM NIGHT SHIFT NURSEKRYSTIN, PT IS AWAKE AND SEATED ON THE BED, WITH 
DRESSING ON THE RT LOWER LEG, PT HAS AN IV LINE ON THE LEFT HAND G. 22 AND WAS NOTED TO BE 
INFILTRATED. SIDE RAILS ARE UP AND CALL LIGHT WITHIN REACH, FALL AND SAFETY PRECAUTION 
ENFORCED. PT DENIES PAIN AND NO SOB NOTED,BEDSIDE COMMODE IN PLACE. NO SIGN OF DISTRESS 
NOTED AND WILL CONTINUE TO MONITOR PT.

## 2019-01-20 NOTE — NUR
PT SLEEPING COMFORTABLE IN BED. PT HAS NO SOB AND NO COMPLAINTS AT THIS TIME.WILL CONTINUE 
TO MONITOR.

## 2019-01-20 NOTE — NUR
PT IS AWAKE AND VITAL SIGNS TAKEN AND IS WITHIN NORMAL LIMIT. NO SIGN OF DISTRESS NOTED AND 
WILL CONTINUE TO MONITOR PT.

## 2019-01-20 NOTE — NUR
PT IS AWAKE AND LYING ON THE BED, ORAL MEDICATIONS GIVEN AND PT TOLERATED IT. NO SIGN OF 
DISTRESS NOTED AND WILL CONTINUE TO MONITOR PT.

## 2019-01-20 NOTE — NUR
DISCHARGED PT VIA WHEELCHAIR WITH , DISCHARGE TEACHINGS AND INSTRUCTIONS ON WOUND 
CARE GIVEN AS INTERPRETED BY AN  AND PT VERBALIZED UNDERSTANDING. IV LINE AND ARM 
BANDS REMOVED. PT IS STABLE AT THIS TIME.

-------------------------------------------------------------------------------

Addendum: 01/20/19 at 1738 by Serene Ceja RN

-------------------------------------------------------------------------------

DISCHARGED TIME FOR THE ABOVE NOTES AND PT IS 6664

## 2019-08-28 ENCOUNTER — HOSPITAL ENCOUNTER (EMERGENCY)
Dept: HOSPITAL 1 - ED | Age: 44
Discharge: HOME | End: 2019-08-28
Payer: MEDICAID

## 2019-08-28 VITALS
HEIGHT: 61 IN | WEIGHT: 140 LBS | BODY MASS INDEX: 26.43 KG/M2 | BODY MASS INDEX: 26.43 KG/M2 | HEIGHT: 61 IN | WEIGHT: 140 LBS

## 2019-08-28 VITALS — DIASTOLIC BLOOD PRESSURE: 69 MMHG | SYSTOLIC BLOOD PRESSURE: 110 MMHG

## 2019-08-28 DIAGNOSIS — E11.9: ICD-10-CM

## 2019-08-28 DIAGNOSIS — J06.9: Primary | ICD-10-CM

## 2019-08-28 LAB
ALBUMIN SERPL-MCNC: 2.4 G/DL (ref 3.4–5)
ALP SERPL-CCNC: 300 U/L (ref 46–116)
ALT SERPL-CCNC: 9 U/L (ref 14–59)
AST SERPL-CCNC: 9 U/L (ref 15–37)
BASOPHILS NFR BLD: 0.3 % (ref 0–2)
BILIRUB SERPL-MCNC: 0.4 MG/DL (ref 0.2–1)
BUN SERPL-MCNC: 22 MG/DL (ref 7–18)
CALCIUM SERPL-MCNC: 7.9 MG/DL (ref 8.5–10.1)
CHLORIDE SERPL-SCNC: 93 MMOL/L (ref 98–107)
CO2 SERPL-SCNC: 26 MMOL/L (ref 21–32)
CREAT SERPL-MCNC: 1.2 MG/DL (ref 0.6–1)
ERYTHROCYTE [DISTWIDTH] IN BLOOD BY AUTOMATED COUNT: 15.8 % (ref 11.5–14.5)
GFR SERPLBLD BASED ON 1.73 SQ M-ARVRAT: 52 ML/MIN
GLUCOSE SERPL-MCNC: 487 MG/DL (ref 74–106)
MICROSCOPIC UR-IMP: YES
PLATELET # BLD: 318 X10^3MCL (ref 130–400)
POTASSIUM SERPL-SCNC: 4.3 MMOL/L (ref 3.5–5.1)
PROT SERPL-MCNC: 7.8 G/DL (ref 6.4–8.2)
RBC # UR STRIP.AUTO: (no result) /UL
SODIUM SERPL-SCNC: 128 MMOL/L (ref 136–145)
UA SPECIFIC GRAVITY: 1.01 (ref 1–1.03)

## 2024-04-09 NOTE — NUR
Last A1C in 9/2023 was 6.6.  Repeat pending along with microalbumin.   Diabetic foot exam performed today.  Per patient, she is UTD on annual eye exam.  Add losartan today for renal protection.  Add statin at next visit.   Recommend regular exercise as tolerated and lower carb diet.   PT HAVING ABD US AT THIS TIME